# Patient Record
Sex: FEMALE | Race: WHITE | NOT HISPANIC OR LATINO | Employment: FULL TIME | URBAN - METROPOLITAN AREA
[De-identification: names, ages, dates, MRNs, and addresses within clinical notes are randomized per-mention and may not be internally consistent; named-entity substitution may affect disease eponyms.]

---

## 2017-09-25 ENCOUNTER — OFFICE VISIT (OUTPATIENT)
Dept: FAMILY MEDICINE | Facility: CLINIC | Age: 52
End: 2017-09-25
Payer: COMMERCIAL

## 2017-09-25 VITALS
OXYGEN SATURATION: 100 % | BODY MASS INDEX: 25.07 KG/M2 | HEIGHT: 66 IN | HEART RATE: 55 BPM | DIASTOLIC BLOOD PRESSURE: 76 MMHG | RESPIRATION RATE: 18 BRPM | WEIGHT: 156 LBS | TEMPERATURE: 98.2 F | SYSTOLIC BLOOD PRESSURE: 120 MMHG

## 2017-09-25 DIAGNOSIS — Z13.1 SCREENING FOR DIABETES MELLITUS: ICD-10-CM

## 2017-09-25 DIAGNOSIS — Z23 NEED FOR PROPHYLACTIC VACCINATION AND INOCULATION AGAINST INFLUENZA: ICD-10-CM

## 2017-09-25 DIAGNOSIS — Z00.00 ROUTINE GENERAL MEDICAL EXAMINATION AT A HEALTH CARE FACILITY: Primary | ICD-10-CM

## 2017-09-25 DIAGNOSIS — R87.810 CERVICAL HIGH RISK HPV (HUMAN PAPILLOMAVIRUS) TEST POSITIVE: ICD-10-CM

## 2017-09-25 DIAGNOSIS — Z13.220 LIPID SCREENING: ICD-10-CM

## 2017-09-25 LAB
CHOLEST SERPL-MCNC: 171 MG/DL
GLUCOSE SERPL-MCNC: 90 MG/DL (ref 70–99)
HDLC SERPL-MCNC: 67 MG/DL
LDLC SERPL CALC-MCNC: 86 MG/DL
NONHDLC SERPL-MCNC: 104 MG/DL
TRIGL SERPL-MCNC: 89 MG/DL

## 2017-09-25 PROCEDURE — 90471 IMMUNIZATION ADMIN: CPT | Performed by: FAMILY MEDICINE

## 2017-09-25 PROCEDURE — 80061 LIPID PANEL: CPT | Performed by: FAMILY MEDICINE

## 2017-09-25 PROCEDURE — 99396 PREV VISIT EST AGE 40-64: CPT | Mod: 25 | Performed by: FAMILY MEDICINE

## 2017-09-25 PROCEDURE — 87624 HPV HI-RISK TYP POOLED RSLT: CPT | Performed by: FAMILY MEDICINE

## 2017-09-25 PROCEDURE — 90686 IIV4 VACC NO PRSV 0.5 ML IM: CPT | Performed by: FAMILY MEDICINE

## 2017-09-25 PROCEDURE — 88175 CYTOPATH C/V AUTO FLUID REDO: CPT | Performed by: FAMILY MEDICINE

## 2017-09-25 PROCEDURE — 36415 COLL VENOUS BLD VENIPUNCTURE: CPT | Performed by: FAMILY MEDICINE

## 2017-09-25 PROCEDURE — 82947 ASSAY GLUCOSE BLOOD QUANT: CPT | Performed by: FAMILY MEDICINE

## 2017-09-25 NOTE — PROGRESS NOTES
Answers for HPI/ROS submitted by the patient on 9/24/2017   Annual Exam:  Getting at least 3 servings of Calcium per day:: Yes  Bi-annual eye exam:: Yes  Dental care twice a year:: Yes  Sleep apnea or symptoms of sleep apnea:: None  Diet:: Regular (no restrictions)  Frequency of exercise:: 4-5 days/week  Taking medications regularly:: Yes  Medication side effects:: None  Additional concerns today:: No  PHQ-2 Score: 0  Duration of exercise:: 45-60 minutes    Injectable Influenza Immunization Documentation    1.  Is the person to be vaccinated sick today?   No    2. Does the person to be vaccinated have an allergy to a component   of the vaccine?   No    3. Has the person to be vaccinated ever had a serious reaction   to influenza vaccine in the past?   No    4. Has the person to be vaccinated ever had Guillain-Barré syndrome?   No    Form completed by Jennifer Avalos MA

## 2017-09-25 NOTE — NURSING NOTE
"Chief Complaint   Patient presents with     Physical       Initial /76  Pulse 55  Temp 98.2  F (36.8  C) (Oral)  Resp 18  Ht 5' 6.3\" (1.684 m)  Wt 156 lb (70.8 kg)  LMP 05/26/2011  SpO2 100%  BMI 24.95 kg/m2 Estimated body mass index is 24.95 kg/(m^2) as calculated from the following:    Height as of this encounter: 5' 6.3\" (1.684 m).    Weight as of this encounter: 156 lb (70.8 kg).  Medication Reconciliation: complete   Jennifer Avalos MA      "

## 2017-09-25 NOTE — PROGRESS NOTES
SUBJECTIVE:   CC: Elaina Farris is an 52 year old woman who presents for preventive health visit.     Physical   Annual:     Getting at least 3 servings of Calcium per day::  Yes    Bi-annual eye exam::  Yes    Dental care twice a year::  Yes    Sleep apnea or symptoms of sleep apnea::  None    Diet::  Regular (no restrictions)    Frequency of exercise::  4-5 days/week    Duration of exercise::  45-60 minutes    Taking medications regularly::  Yes    Medication side effects::  None    Additional concerns today::  No        Today's PHQ-2 Score:   PHQ-2 ( 1999 Pfizer) 9/24/2017   Q1: Little interest or pleasure in doing things 0   Q2: Feeling down, depressed or hopeless 0   PHQ-2 Score 0   Q1: Little interest or pleasure in doing things Not at all   Q2: Feeling down, depressed or hopeless Not at all   PHQ-2 Score 0       Abuse: Current or Past(Physical, Sexual or Emotional)- No  Do you feel safe in your environment - Yes    Social History   Substance Use Topics     Smoking status: Former Smoker     Packs/day: 1.00     Years: 25.00     Types: Cigarettes     Start date: 1/1/1978     Quit date: 1/1/2003     Smokeless tobacco: Never Used     Alcohol use 36.0 oz/week      Comment: 2 drinks per month     The patient does not drink >3 drinks per day nor >7 drinks per week.    Reviewed orders with patient.  Reviewed health maintenance and updated orders accordingly - Yes  BP Readings from Last 3 Encounters:   09/25/17 120/76   09/21/16 122/79   06/24/15 100/66    Wt Readings from Last 3 Encounters:   09/25/17 156 lb (70.8 kg)   09/21/16 162 lb (73.5 kg)   06/24/15 169 lb 12 oz (77 kg)                          Pertinent mammograms are reviewed under the imaging tab.  History of abnormal Pap smear:   YES - updated in Problem List and Health Maintenance accordingly (pos HR HPV Sept 2016).  Last 3 Pap Results:   PAP (no units)   Date Value   09/21/2016 NIL   06/17/2013 OTHER-NIL, See Result   01/13/2011 NIL       Reviewed  and updated as needed this visit by clinical staffTobacco  Allergies  Meds  Med Hx  Surg Hx  Fam Hx  Soc Hx        Reviewed and updated as needed this visit by Provider  Tobacco  Meds  Med Hx  Surg Hx  Fam Hx  Soc Hx       Past Medical History:   Diagnosis Date     Anxiety disorder      Bulimia nervosa      Cervical high risk HPV (human papillomavirus) test positive 16    (not 16 or 18)     Genital prolapse NEC      Irritable bowel syndrome      Major depression      Menopause 2011      Past Surgical History:   Procedure Laterality Date     C INCISION EXTEN FOOT/TOE TENDON      toe shortened     COLONOSCOPY  10/9/13    rpt 10 yrs     LASIK      bilateral     Obstetric History       T2      L2     SAB0   TAB0   Ectopic0   Multiple0   Live Births2       # Outcome Date GA Lbr Jonathan/2nd Weight Sex Delivery Anes PTL Lv   2 Term 95 40w0d       MOMO   1 Term 93 40w0d       MOMO      Obstetric Comments    x 2       ROS:  CONST: NEGATIVE for fevers/chills/sweats, unexplained weight loss/gain, and fatigue  EYES: NEGATIVE for change in vision  ENT: NEGATIVE for difficulty hearing/tinnitus, and problems with teeth/gums  BREAST: NEGATIVE for breast lump/discharge  CV: NEGATIVE for chest pain/discomfort, leg pain with exercise, and palpitations  RESP: NEGATIVE for cough/wheeze, and difficulty breathing  GI: NEGATIVE for abdominal pain, blood in bowel movement, and nausea/vomiting/diarrhea  : NEGATIVE for nighttime urination, leaking urine, unusual vaginal bleeding, penile/vaginal discharge, and concerns about sexual function  MS: NEGATIVE for muscle/joint pain  SKIN: NEGATIVE for rash or mole change  NEURO: NEGATIVE for headache, dizziness/lightheadedness, numbness, memory loss, and loss of coordination  PSYCH: NEGATIVE for anxiety/stress, problems with sleep, and depression  HEME: NEGATIVE for unexplained lumps, and easy bruising/bleeding  ENDO: NEGATIVE for  "excessive thirst or urination  ALL: NEGATIVE for hay fever/allergies      OBJECTIVE:   /76  Pulse 55  Temp 98.2  F (36.8  C) (Oral)  Resp 18  Ht 5' 6.3\" (1.684 m)  Wt 156 lb (70.8 kg)  LMP 05/26/2011  SpO2 100%  BMI 24.95 kg/m2  EXAM:  GENERAL APPEARANCE: healthy, alert and no distress  EYES: Eyes grossly normal to inspection, PERRL and conjunctivae and sclerae normal  HENT: ear canals and TM's normal, nose and mouth without ulcers or lesions, oropharynx clear and oral mucous membranes moist  NECK: no adenopathy, no asymmetry, masses, or scars and thyroid normal to palpation  RESP: lungs clear to auscultation - no rales, rhonchi or wheezes  BREAST: normal without masses, tenderness or nipple discharge and no palpable axillary masses or adenopathy  CV: regular rate and rhythm, normal S1 S2, no S3 or S4, no murmur, click or rub, no peripheral edema and peripheral pulses strong  ABDOMEN: soft, nontender, no hepatosplenomegaly, no masses and bowel sounds normal   (female): normal female external genitalia, normal urethral meatus, vaginal mucosal atrophy noted, normal cervix, adnexae, and uterus without masses or abnormal discharge  MS: no musculoskeletal defects are noted and gait is age appropriate without ataxia  SKIN: no suspicious lesions or rashes  NEURO: Normal strength and tone, sensory exam grossly normal, mentation intact and speech normal  PSYCH: mentation appears normal and affect normal/bright    ASSESSMENT/PLAN:     1. Routine general medical examination at a health care facility  Mammogram next month    2. Cervical high risk HPV (human papillomavirus) test positive  - Pap imaged thin layer diagnostic with HPV (select HPV order below)  - HPV High Risk Types DNA Cervical    3. Need for prophylactic vaccination and inoculation against influenza  - FLU VAC, SPLIT VIRUS IM > 3 YO (QUADRIVALENT) [11120]  - Vaccine Administration, Initial [99055]    4. Lipid screening  - Lipid panel reflex to " direct LDL    5. Screening for diabetes mellitus  - Glucose     COUNSELING:  Reviewed preventive health counseling, as reflected in patient instructions        Counseling Resources:  ATP IV Guidelines  Pooled Cohorts Equation Calculator  Breast Cancer Risk Calculator  FRAX Risk Assessment  ICSI Preventive Guidelines  Dietary Guidelines for Americans, 2010  [employer]'s MyPlate  ASA Prophylaxis  Lung CA Screening    Bessy Reddy MD  [facility]

## 2017-09-25 NOTE — MR AVS SNAPSHOT
After Visit Summary   9/25/2017    Elaina Farris    MRN: [id number]           Patient Information     Date Of Birth          1965        Visit Information        Provider Department      9/25/2017 9:00 AM Bessy Reddy MD [facility]        Today's Diagnoses     Routine general medical examination at a health care facility    -  1    Cervical high risk HPV (human papillomavirus) test positive        Need for prophylactic vaccination and inoculation against influenza        Lipid screening        Screening for diabetes mellitus          Care Instructions      Preventive Health Recommendations  Female Ages 50 - 64    Yearly exam: See your health care provider every year in order to  o Review health changes.   o Discuss preventive care.    o Review your medicines if your doctor has prescribed any.      Get a Pap test every three years (unless you have an abnormal result and your provider advises testing more often).    If you get Pap tests with HPV test, you only need to test every 5 years, unless you have an abnormal result.     You do not need a Pap test if your uterus was removed (hysterectomy) and you have not had cancer.    You should be tested each year for STDs (sexually transmitted diseases) if you're at risk.     Have a mammogram every 1 to 2 years.    Have a colonoscopy at age 50, or have a yearly FIT test (stool test). These exams screen for colon cancer.      Have a cholesterol test every 5 years, or more often if advised.    Have a diabetes test (fasting glucose) every three years. If you are at risk for diabetes, you should have this test more often.     If you are at risk for osteoporosis (brittle bone disease), think about having a bone density scan (DEXA).    Shots: Get a flu shot each year. Get a tetanus shot every 10 years.    Nutrition:     Eat at least 5 servings of fruits and vegetables each day.    Eat whole-grain bread, whole-wheat pasta and brown rice  "instead of white grains and rice.    Talk to your provider about Calcium and Vitamin D.     Lifestyle    Exercise at least 150 minutes a week (30 minutes a day, 5 days a week). This will help you control your weight and prevent disease.    Limit alcohol to one drink per day.    No smoking.     Wear sunscreen to prevent skin cancer.     See your dentist every six months for an exam and cleaning.    See your eye doctor every 1 to 2 years.            Follow-ups after your visit        Who to contact     If you have questions or need follow up information about today's clinic visit or your schedule please contact [facility] directly at [phone].  Normal or non-critical lab and imaging results will be communicated to you by MyChart, letter or phone within 4 business days after the clinic has received the results. If you do not hear from us within 7 days, please contact the clinic through [employer] or phone. If you have a critical or abnormal lab result, we will notify you by phone as soon as possible.  Submit refill requests through [employer] or call your pharmacy and they will forward the refill request to us. Please allow 3 business days for your refill to be completed.          Additional Information About Your Visit        [employer] Information     [employer] gives you secure access to your electronic health record. If you see a primary care provider, you can also send messages to your care team and make appointments. If you have questions, please call your primary care clinic.  If you do not have a primary care provider, please call [phone] and they will assist you.        Care EveryWhere ID     This is your Care EveryWhere ID. This could be used by other organizations to access your [place] medical records  [id number]        Your Vitals Were     Pulse Temperature Respirations Height Last Period Pulse Oximetry    55 98.2  F (36.8  C) (Oral) 18 5' 6.3\" (1.684 m) 05/26/2011 100%    BMI (Body Mass " Index)                   24.95 kg/m2            Blood Pressure from Last 3 Encounters:   09/25/17 120/76   09/21/16 122/79   06/24/15 100/66    Weight from Last 3 Encounters:   09/25/17 156 lb (70.8 kg)   09/21/16 162 lb (73.5 kg)   06/24/15 169 lb 12 oz (77 kg)              We Performed the Following     Glucose     HPV High Risk Types DNA Cervical     Lipid panel reflex to direct LDL     Pap imaged thin layer diagnostic with HPV (select HPV order below)        Primary Care Provider Office Phone # Fax #    Bessy Reddy [phone]       [address]  [facility] [address]        Equal Access to Services     ARSENIO MCGREGOR : Baldemar Haque, elvira mendes, augustin roblesmakacy ye, mamadou west . So [facility] [phone].    ATENCIÓN: Si habla español, tiene a calabrese disposición servicios gratuitos de asistencia lingüística. Llame al [phone].    We comply with applicable federal civil rights laws and [place] laws. We do not discriminate on the basis of race, color, national origin, age, disability sex, sexual orientation or gender identity.            Thank you!     Thank you for choosing [facility]  for your care. Our goal is always to provide you with excellent care. Hearing back from our patients is one way we can continue to improve our services. Please take a few minutes to complete the written survey that you may receive in the mail after your visit with us. Thank you!             Your Updated Medication List - Protect others around you: Learn how to safely use, store and throw away your medicines at www.disposemymeds.org.          This list is accurate as of: 9/25/17  9:44 AM.  Always use your most recent med list.                   Brand Name Dispense Instructions for use Diagnosis    traZODone 50 MG tablet    DESYREL    90 tablet    Take 1/2-1 tab at bedtime as needed for sleep    Insomnia, unspecified type

## 2017-09-27 LAB
COPATH REPORT: NORMAL
PAP: NORMAL

## 2017-09-29 LAB
FINAL DIAGNOSIS: ABNORMAL
HPV HR 12 DNA CVX QL NAA+PROBE: POSITIVE
HPV16 DNA SPEC QL NAA+PROBE: NEGATIVE
HPV18 DNA SPEC QL NAA+PROBE: NEGATIVE
SPECIMEN DESCRIPTION: ABNORMAL

## 2017-10-25 ENCOUNTER — OFFICE VISIT (OUTPATIENT)
Dept: OBGYN | Facility: CLINIC | Age: 52
End: 2017-10-25
Payer: COMMERCIAL

## 2017-10-25 DIAGNOSIS — R87.810 CERVICAL HIGH RISK HPV (HUMAN PAPILLOMAVIRUS) TEST POSITIVE: Primary | ICD-10-CM

## 2017-10-25 LAB — BETA HCG QUAL IFA URINE: ABNORMAL

## 2017-10-25 PROCEDURE — 57455 BIOPSY OF CERVIX W/SCOPE: CPT | Performed by: OBSTETRICS & GYNECOLOGY

## 2017-10-25 PROCEDURE — 88305 TISSUE EXAM BY PATHOLOGIST: CPT | Performed by: OBSTETRICS & GYNECOLOGY

## 2017-10-25 PROCEDURE — 99201 ZZC OFFICE/OUTPT VISIT, NEW, LEVEL I: CPT | Mod: 25 | Performed by: OBSTETRICS & GYNECOLOGY

## 2017-10-25 NOTE — PROGRESS NOTES
INDICATION: Elaina Farris is a 52 year old female who presents for colposcopy.  Pap smear was reported as NIL, other HPV + (same as last year).   Informed consent obtained for procedure.  We discussed cervical dysplasia and HPV.              COLPOSCOPIC EXAM:  Satisfactory      Using standard technique and acetic acid application, the cervix and vagina were examined using the colposcope.  The transformation zone appeared abnormal, with minimal white epithelium most prominent posterior to the right, and representative biopsy of 7:00 was sent.  Two small polypoid masses were noted protruding from the cervical os, and these were biopsy-removed.    ECC was not performed.   Monsels applied for hemostasis.    Colposcopic Impression: NIL pap.  Other HPV +.  Possible dysplasia.     PLAN: Post Colposcopy Instructions were given.  Call in 1 week for biopsy results.  Will advise followup depending on results.      In addition to the procedure, I spent  10 minutes with the patient, with more than 50% spent in counseling/discussing the diagnosis and treatment options.

## 2017-10-25 NOTE — MR AVS SNAPSHOT
After Visit Summary   10/25/2017    Elaina Farris    MRN: [id number]           Patient Information     Date Of Birth          1965        Visit Information        Provider Department      10/25/2017 11:15 AM Annmarie Mejia MD [facility]        Today's Diagnoses     [place] for NIL pap, other HPV +    -  1      Care Instructions    Colposcopy Post-Procedure Patient Instructions      You may experience any of the following for a couple of days following colposcopy:    Mild cramping    Vaginal bleeding     Vaginal discharge that looks black and clumpy    Please call your healthcare provider if you have any of the following symptoms:    Fever--Temperature greater than 100 degrees    Cramping after 48 hours    Bleeding heavier than a normal period in the first 24-48 hours    If you are bleeding and soaking more than one pad an hour    Or any other worrisome problems.    We recommend that:    You use pads, not tampons for the bleeding.    You may resume sexual activity in 2-3 days, or after bleeding stops.    You may use Tylenol or ibuprofen (Motrin or Advil) for any discomfort.      [facility] - OB/[facility]N : [phone]            Follow-ups after your visit        Your next 10 appointments already scheduled     Oct 27, 2017 12:45 PM CDT   Screening Mammogram with EAMA1   [facility] ([facility])    [address] ,[address]  [facility] [address] [phone]           Do NOT use body powder, lotions, perfume or deodorant the day of the exam.  If your last mammogram was not done at [place], please bring your mammogram films. We will need the name of your provider to send a copy of your report.  A mammogram may be covered on an annual or biannual basis, please check with your insurance company.              Future tests that were ordered for you today     Open Future Orders        Priority Expected Expires Ordered    MA Screening Digital  Bilateral Routine  10/25/2018 10/25/2017            Who to contact     If you have questions or need follow up information about today's clinic visit or your schedule please contact [facility] directly at [phone].  Normal or non-critical lab and imaging results will be communicated to you by MyChart, letter or phone within 4 business days after the clinic has received the results. If you do not hear from us within 7 days, please contact the clinic through [employer] or phone. If you have a critical or abnormal lab result, we will notify you by phone as soon as possible.  Submit refill requests through [employer] or call your pharmacy and they will forward the refill request to us. Please allow 3 business days for your refill to be completed.          Additional Information About Your Visit        [email or web address] Information     [employer] gives you secure access to your electronic health record. If you see a primary care provider, you can also send messages to your care team and make appointments. If you have questions, please call your primary care clinic.  If you do not have a primary care provider, please call [phone] and they will assist you.        Care EveryWhere ID     This is your Care EveryWhere ID. This could be used by other organizations to access your [place] medical records  [id number]        Your Vitals Were     Last Period                   05/26/2011            Blood Pressure from Last 3 Encounters:   09/25/17 120/76   09/21/16 122/79   06/24/15 100/66    Weight from Last 3 Encounters:   09/25/17 156 lb (70.8 kg)   09/21/16 162 lb (73.5 kg)   06/24/15 169 lb 12 oz (77 kg)              We Performed the Following     Beta HCG qual IFA urine     COLP CERVIX/UPPER VAGINA W BX CERVIX     Surgical pathology exam        Primary Care Provider Office Phone # Fax #    Bessy Reddy [phone] [address] [place] [address]        Equal Access to Services     ARSENIO  GAAR : Hadii aad sonny horacio Haque, warejida luqadaha, qaybta kameera gwendolyndenver, waxmary giuseppe hayzulma eduardoalexcynthia quinones. So [facility] [phone].    ATENCIÓN: Si habla español, tiene a calabrese disposición servicios gratuitos de asistencia lingüística. Llame al [phone].    We comply with applicable federal civil rights laws and [place] laws. We do not discriminate on the basis of race, color, national origin, age, disability, sex, sexual orientation, or gender identity.            Thank you!     Thank you for choosing [facility]  for your care. Our goal is always to provide you with excellent care. Hearing back from our patients is one way we can continue to improve our services. Please take a few minutes to complete the written survey that you may receive in the mail after your visit with us. Thank you!             Your Updated Medication List - Protect others around you: Learn how to safely use, store and throw away your medicines at www.disposemymeds.org.          This list is accurate as of: 10/25/17  1:03 PM.  Always use your most recent med list.                   Brand Name Dispense Instructions for use Diagnosis    traZODone 50 MG tablet    DESYREL    90 tablet    Take 1/2-1 tab at bedtime as needed for sleep    Insomnia, unspecified type

## 2017-10-25 NOTE — PATIENT INSTRUCTIONS

## 2017-10-25 NOTE — NURSING NOTE
"Chief Complaint   Patient presents with     Colposcopy       Initial LMP 2011 Estimated body mass index is 24.95 kg/(m^2) as calculated from the following:    Height as of 17: 5' 6.3\" (1.684 m).    Weight as of 17: 156 lb (70.8 kg).  BP completed using cuff size: regular        The following HM Due: NONE      The following patient reported/Care Every where data was sent to:  P ABSTRACT QUALITY INITIATIVES [28531]  none     n/a             "

## 2017-10-27 DIAGNOSIS — Z12.31 VISIT FOR SCREENING MAMMOGRAM: ICD-10-CM

## 2017-10-27 LAB — COPATH REPORT: NORMAL

## 2017-10-27 PROCEDURE — G0202 SCR MAMMO BI INCL CAD: HCPCS | Mod: TC

## 2018-09-13 ENCOUNTER — OFFICE VISIT (OUTPATIENT)
Dept: FAMILY MEDICINE | Facility: CLINIC | Age: 53
End: 2018-09-13
Payer: COMMERCIAL

## 2018-09-13 ENCOUNTER — TRANSFERRED RECORDS (OUTPATIENT)
Dept: HEALTH INFORMATION MANAGEMENT | Facility: CLINIC | Age: 53
End: 2018-09-13

## 2018-09-13 VITALS
OXYGEN SATURATION: 99 % | TEMPERATURE: 98.9 F | SYSTOLIC BLOOD PRESSURE: 107 MMHG | WEIGHT: 154 LBS | DIASTOLIC BLOOD PRESSURE: 75 MMHG | BODY MASS INDEX: 24.75 KG/M2 | HEART RATE: 53 BPM | HEIGHT: 66 IN | RESPIRATION RATE: 18 BRPM

## 2018-09-13 DIAGNOSIS — Z13.220 LIPID SCREENING: ICD-10-CM

## 2018-09-13 DIAGNOSIS — Z13.1 SCREENING FOR DIABETES MELLITUS: ICD-10-CM

## 2018-09-13 DIAGNOSIS — G47.00 INSOMNIA, UNSPECIFIED TYPE: ICD-10-CM

## 2018-09-13 DIAGNOSIS — Z00.00 ROUTINE GENERAL MEDICAL EXAMINATION AT A HEALTH CARE FACILITY: Primary | ICD-10-CM

## 2018-09-13 DIAGNOSIS — R87.810 CERVICAL HIGH RISK HPV (HUMAN PAPILLOMAVIRUS) TEST POSITIVE: ICD-10-CM

## 2018-09-13 PROCEDURE — 36415 COLL VENOUS BLD VENIPUNCTURE: CPT | Performed by: FAMILY MEDICINE

## 2018-09-13 PROCEDURE — 99396 PREV VISIT EST AGE 40-64: CPT | Performed by: FAMILY MEDICINE

## 2018-09-13 PROCEDURE — 87624 HPV HI-RISK TYP POOLED RSLT: CPT | Performed by: FAMILY MEDICINE

## 2018-09-13 PROCEDURE — 82947 ASSAY GLUCOSE BLOOD QUANT: CPT | Performed by: FAMILY MEDICINE

## 2018-09-13 PROCEDURE — 88175 CYTOPATH C/V AUTO FLUID REDO: CPT | Performed by: FAMILY MEDICINE

## 2018-09-13 PROCEDURE — 80061 LIPID PANEL: CPT | Performed by: FAMILY MEDICINE

## 2018-09-13 RX ORDER — TRAZODONE HYDROCHLORIDE 50 MG/1
TABLET, FILM COATED ORAL
Qty: 90 TABLET | Refills: 1 | Status: SHIPPED | OUTPATIENT
Start: 2018-09-13 | End: 2019-10-21

## 2018-09-13 RX ORDER — TRAZODONE HYDROCHLORIDE 50 MG/1
TABLET, FILM COATED ORAL
Qty: 90 TABLET | Refills: 1 | Status: SHIPPED | OUTPATIENT
Start: 2018-09-13 | End: 2018-09-13

## 2018-09-13 NOTE — MR AVS SNAPSHOT
After Visit Summary   9/13/2018    Elaina Farris    MRN: [id number]           Patient Information     Date Of Birth          1965        Visit Information        Provider Department      9/13/2018 12:00 PM Bessy Reddy MD [facility] [place]        Today's Diagnoses     Routine general medical examination at a health care facility    -  1    Cervical high risk HPV (human papillomavirus) test positive        Lipid screening        Screening for diabetes mellitus        Insomnia, unspecified type          Care Instructions      Preventive Health Recommendations  Female Ages 50 - 64    Yearly exam: See your health care provider every year in order to  o Review health changes.   o Discuss preventive care.    o Review your medicines if your doctor has prescribed any.      Get a Pap test every three years (unless you have an abnormal result and your provider advises testing more often).    If you get Pap tests with HPV test, you only need to test every 5 years, unless you have an abnormal result.     You do not need a Pap test if your uterus was removed (hysterectomy) and you have not had cancer.    You should be tested each year for STDs (sexually transmitted diseases) if you're at risk.     Have a mammogram every 1 to 2 years.    Have a colonoscopy at age 50, or have a yearly FIT test (stool test). These exams screen for colon cancer.      Have a cholesterol test every 5 years, or more often if advised.    Have a diabetes test (fasting glucose) every three years. If you are at risk for diabetes, you should have this test more often.     If you are at risk for osteoporosis (brittle bone disease), think about having a bone density scan (DEXA).    Shots: Get a flu shot each year. Get a tetanus shot every 10 years.    Nutrition:     Eat at least 5 servings of fruits and vegetables each day.    Eat whole-grain bread, whole-wheat pasta and brown rice instead of white grains and rice.    Get  "adequate Calcium and Vitamin D.     Lifestyle    Exercise at least 150 minutes a week (30 minutes a day, 5 days a week). This will help you control your weight and prevent disease.    Limit alcohol to one drink per day.    No smoking.     Wear sunscreen to prevent skin cancer.     See your dentist every six months for an exam and cleaning.    See your eye doctor every 1 to 2 years.            Follow-ups after your visit        Your next 10 appointments already scheduled     Oct 29, 2018  7:30 AM CDT   MA SCREENING BILATERAL W/ YVES with EAMA1   [facility]    [address] ,[address]  [facility] [address] [phone]           Three-dimensional (3D) mammograms are available at [place] locations in [facility], [facility], [facility], and [place]. [facility] locations include [place] [facility] in [place]. Benefits of 3D mammograms include: - Improved rate of cancer detection - Decreases your chance of having to go back for more tests, which means fewer: - \"False-positive\" results (This means that there is an abnormal area but it isn't cancer.) - Invasive testing procedures, such as a biopsy or surgery - Can provide clearer images of the breast if you have dense breast tissue. 3D mammography is an optional exam that anyone can have with a 2D mammogram. It doesn't replace or take the place of a 2D mammogram. 2D mammograms remain an effective screening test for all women.  Not all insurance companies cover the cost of a 3D mammogram. Check with your insurance.              Who to contact     If you have questions or need follow up information about today's clinic visit or your schedule please contact [facility] directly at [phone].  Normal or non-critical lab and imaging results will be communicated to you by MyChart, letter or phone within 4 business days after the clinic has received " "the results. If you do not hear from us within 7 days, please contact the clinic through [employer] or phone. If you have a critical or abnormal lab result, we will notify you by phone as soon as possible.  Submit refill requests through [employer] or call your pharmacy and they will forward the refill request to us. Please allow 3 business days for your refill to be completed.          Additional Information About Your Visit        [employer] Information     [employer] gives you secure access to your electronic health record. If you see a primary care provider, you can also send messages to your care team and make appointments. If you have questions, please call your primary care clinic.  If you do not have a primary care provider, please call [phone] and they will assist you.        Care EveryWhere ID     This is your Care EveryWhere ID. This could be used by other organizations to access your [place] medical records  [id number]        Your Vitals Were     Pulse Temperature Respirations Height Last Period Pulse Oximetry    53 98.9  F (37.2  C) (Oral) 18 5' 6\" (1.676 m) 05/26/2011 99%    BMI (Body Mass Index)                   24.86 kg/m2            Blood Pressure from Last 3 Encounters:   09/13/18 107/75   09/25/17 120/76   09/21/16 122/79    Weight from Last 3 Encounters:   09/13/18 154 lb (69.9 kg)   09/25/17 156 lb (70.8 kg)   09/21/16 162 lb (73.5 kg)              We Performed the Following     Glucose     HPV High Risk Types DNA Cervical     Lipid panel reflex to direct LDL Fasting     Pap imaged thin layer diagnostic with HPV (select HPV order below)          Today's Medication Changes          These changes are accurate as of 9/13/18 12:41 PM.  If you have any questions, ask your nurse or doctor.               Start taking these medicines.        Dose/Directions    traZODone 50 MG tablet   Commonly known as:  DESYREL   Used for:  Insomnia, unspecified type   Started by:  Bessy Reddy MD        Take 1/2-1 " tab at bedtime as needed for sleep   Quantity:  90 tablet   Refills:  1            Where to get your medicines      These medications were sent to [facility] - [address], [facility] [address]     Phone:  [phone]     traZODone 50 MG tablet                Primary Care Provider Office Phone # Fax #    Bessy Reddy [phone]       [address]  [facility] [address]        Equal Access to Services     [facility]: Hadii aad ku hadasho Soomaali, waaxda luqadaha, qaybta kaalmada adeegyada, waxay idiin hayaan adeeg kharacynthia west . So [facility] [phone].    ATENCIÓN: Si habla español, tiene a calabrese disposición servicios gratuitos de asistencia lingüística. [facility] [phone].    We comply with applicable federal civil rights laws and [place] laws. We do not discriminate on the basis of race, color, national origin, age, disability, sex, sexual orientation, or gender identity.            Thank you!     Thank you for choosing [facility]  for your care. Our goal is always to provide you with excellent care. Hearing back from our patients is one way we can continue to improve our services. Please take a few minutes to complete the written survey that you may receive in the mail after your visit with us. Thank you!             Your Updated Medication List - Protect others around you: Learn how to safely use, store and throw away your medicines at www.disposemymeds.org.          This list is accurate as of 9/13/18 12:41 PM.  Always use your most recent med list.                   Brand Name Dispense Instructions for use Diagnosis    traZODone 50 MG tablet    DESYREL    90 tablet    Take 1/2-1 tab at bedtime as needed for sleep    Insomnia, unspecified type

## 2018-09-13 NOTE — PROGRESS NOTES
SUBJECTIVE:   CC: Elaina Farris is an 53 year old woman who presents for preventive health visit.     Physical   Annual:     Getting at least 3 servings of Calcium per day:  Yes    Bi-annual eye exam:  NO    Dental care twice a year:  Yes    Sleep apnea or symptoms of sleep apnea:  None    Diet:  Regular (no restrictions)    Frequency of exercise:  4-5 days/week    Duration of exercise:  45-60 minutes    Taking medications regularly:  Not Applicable    Additional concerns today:  No      Today's PHQ-2 Score:   PHQ-2 ( 1999 [employer]) 9/12/2018   Q1: Little interest or pleasure in doing things 0   Q2: Feeling down, depressed or hopeless 0   PHQ-2 Score 0   Q1: Little interest or pleasure in doing things Not at all   Q2: Feeling down, depressed or hopeless Not at all   PHQ-2 Score 0       Abuse: Current or Past(Physical, Sexual or Emotional)- No  Do you feel safe in your environment - Yes    Social History   Substance Use Topics     Smoking status: Former Smoker     Packs/day: 1.00     Years: 25.00     Types: Cigarettes     Start date: 1/1/1978     Quit date: 1/1/2003     Smokeless tobacco: Never Used     Alcohol use 36.0 oz/week      Comment: Less than 3 a week     Alcohol Use 9/12/2018   If you drink alcohol do you typically have greater than 3 drinks per day OR greater than 7 drinks per week? No       Reviewed orders with patient.  Reviewed health maintenance and updated orders accordingly - Yes  BP Readings from Last 3 Encounters:   09/13/18 107/75   09/25/17 120/76   09/21/16 122/79    Wt Readings from Last 3 Encounters:   09/13/18 154 lb (69.9 kg)   09/25/17 156 lb (70.8 kg)   09/21/16 162 lb (73.5 kg)                        Pertinent mammograms are reviewed under the imaging tab.  History of abnormal Pap smear: YES - updated in Problem List and Health Maintenance accordingly  PAP / HPV Latest Ref Rng & Units 9/25/2017 9/21/2016 6/17/2013   PAP - NIL NIL OTHER-NIL, See Result   HPV 16 DNA NEG:Negative  Negative Negative -   HPV 18 DNA NEG:Negative Negative Negative -   OTHER HR HPV NEG:Negative Positive(A) Positive(A) -     Reviewed and updated as needed this visit by clinical staff  Tobacco  Allergies  Meds  Med Hx  Surg Hx  Fam Hx  Soc Hx        Reviewed and updated as needed this visit by Provider  Tobacco  Med Hx  Surg Hx  Fam Hx  Soc Hx       Past Medical History:   Diagnosis Date     Anxiety disorder      Bulimia nervosa      Cervical high risk HPV (human papillomavirus) test positive 16, 17    (not 16 or 18)     Irritable bowel syndrome      Major depression      Menopause 2011     Other specified genital prolapse(618.89)       Past Surgical History:   Procedure Laterality Date     C INCISION EXTEN FOOT/TOE TENDON      toe shortened     COLONOSCOPY  10/9/13    rpt 10 yrs     LASIK      bilateral     Obstetric History       T2      L2     SAB0   TAB0   Ectopic0   Multiple0   Live Births2       # Outcome Date GA Lbr Jonathan/2nd Weight Sex Delivery Anes PTL Lv   2 Term 95 40w0d       MOMO   1 Term 93 40w0d       MOMO      Obstetric Comments    x 2       Review of Systems  CONST: NEGATIVE for fevers/chills/sweats, unexplained weight loss/gain, and fatigue  EYES: NEGATIVE for change in vision  ENT: NEGATIVE for difficulty hearing/tinnitus, and problems with teeth/gums  BREAST: NEGATIVE for breast lump/discharge  CV: NEGATIVE for chest pain/discomfort, leg pain with exercise, and palpitations  RESP: NEGATIVE for cough/wheeze, and difficulty breathing  GI: NEGATIVE for abdominal pain, blood in bowel movement, and nausea/vomiting/diarrhea  : NEGATIVE for nighttime urination, leaking urine, unusual vaginal bleeding, vaginal discharge, and concerns about sexual function  MS: NEGATIVE for muscle/joint pain  SKIN: NEGATIVE for rash or mole change  NEURO: NEGATIVE for headache, dizziness/lightheadedness, numbness, memory loss, and loss of  "coordination  PSYCH: NEGATIVE for anxiety/stress, problems with sleep, and depression  HEME: NEGATIVE for unexplained lumps, and easy bruising/bleeding  ENDO: NEGATIVE for excessive thirst or urination  ALL: NEGATIVE for hay fever/allergies      OBJECTIVE:   /75 (BP Location: Right arm, Patient Position: Sitting, Cuff Size: Adult Regular)  Pulse 53  Temp 98.9  F (37.2  C) (Oral)  Resp 18  Ht 5' 6\" (1.676 m)  Wt 154 lb (69.9 kg)  LMP 05/26/2011  SpO2 99%  BMI 24.86 kg/m2  Physical Exam  GENERAL APPEARANCE: healthy, alert and no distress  EYES: Eyes grossly normal to inspection, PERRL and conjunctivae and sclerae normal  HENT: ear canals and TM's normal, nose and mouth without ulcers or lesions, oropharynx clear and oral mucous membranes moist  NECK: no adenopathy, no asymmetry, masses, or scars and thyroid normal to palpation  RESP: lungs clear to auscultation - no rales, rhonchi or wheezes  BREAST: normal without masses, tenderness or nipple discharge and no palpable axillary masses or adenopathy  CV: regular rate and rhythm, normal S1 S2, no S3 or S4, no murmur, click or rub, no peripheral edema and peripheral pulses strong  ABDOMEN: soft, nontender, no hepatosplenomegaly, no masses    (female): normal female external genitalia, normal urethral meatus, vaginal mucosal atrophy noted, normal cervix, and no abnormal discharge  MS: no musculoskeletal defects are noted and gait is age appropriate without ataxia  SKIN: no suspicious lesions or rashes  NEURO: Normal strength and tone, sensory exam grossly normal, mentation intact and speech normal  PSYCH: mentation appears normal and affect normal/bright    ASSESSMENT/PLAN:   1. Routine general medical examination at a health care facility  - Lipid panel reflex to direct LDL Fasting  - Glucose    2. Cervical high risk HPV (human papillomavirus) test positive  - Pap imaged thin layer diagnostic with HPV (select HPV order below)  - HPV High Risk Types DNA " Cervical    3. Lipid screening  - Lipid panel reflex to direct LDL Fasting    4. Screening for diabetes mellitus  - Glucose    5. Insomnia, unspecified type  Occasional use of trazodone  - traZODone (DESYREL) 50 MG tablet; Take 1/2-1 tab at bedtime as needed for sleep  Dispense: 90 tablet; Refill: 1    COUNSELING:  Reviewed preventive health counseling, as reflected in patient instructions      Counseling Resources:  ATP IV Guidelines  Pooled Cohorts Equation Calculator  Breast Cancer Risk Calculator  FRAX Risk Assessment  ICSI Preventive Guidelines  Dietary Guidelines for Americans, 2010  USDA's MyPlate  ASA Prophylaxis  Lung CA Screening    Bessy Reddy MD  [facility]

## 2018-09-13 NOTE — LETTER
[facility] [place]  [address] [place] [address] [phone]    October 2, 2018    Elainamaury Burksna                                                                                                                     [address] [place] [id number]          Dear MsHudson,    We are contacting you in writing because we have been unable to reach you by phone.    This letter is in regards to the pap smear and HPV (Human Papillomavirus) test you had done recently. Your PAP test result is normal, but your HPV (Human Papillomavirus) test was positive for a high risk type of the HPV. HPV is a common virus found in sexually active men and women. Some high risk strains of the HPV virus can be risk factors for later development of cervical cancer.    About 80 percent of women have been exposed to HPV virus throughout their lifetime. There is no medication for the treatment of HPV. Typically your own immune system gets rid of the virus before it does harm. HPV is spread by direct skin-to-skin contact, including sexual intercourse, oral sex, anal sex, or any other contact involving the genital area (example: hand to genital contact). It is not possible to become infected with HPV by touching an object, such as a toilet seat. Most people who are infected with HPV have no signs or symptoms.    Your doctor has recommended that you have specific test called colposcopy. This test allows the provider to closely examine your cervix. If biopsies are taken, the results will be complete within two weeks and will be used to determine the plan for future follow-up.      Please call [facility] to schedule this procedure with Dr. Mejia. Schedule this for a time when you are not due to have a period (if having regular menstrual bleeding). You can take an over the counter pain reliever 1 hour before your colposcopy. Nothing in the vagina for 24 hours before your colposcopy (no sex, douches,  vaginal medications or lubricants). They may also check a urine pregnancy test.     If you have additional questions regarding this result, please call our registered nurse, Eielen at [phone].    Sincerely,  Bessy Reddy MD./  Eileen Berry RN-Pap Tracking

## 2018-09-13 NOTE — PROGRESS NOTES
"  Injectable Influenza Immunization Documentation    1.  Is the person to be vaccinated sick today?  {YES/NO DEFAULT NO:25156::\" No\"}    2. Does the person to be vaccinated have an allergy to a component   of the vaccine?  {YES/NO DEFAULT NO:19902::\" No\"}  Egg Allergy Algorithm Link    3. Has the person to be vaccinated ever had a serious reaction   to influenza vaccine in the past?  {YES/NO DEFAULT NO:97040::\" No\"}    4. Has the person to be vaccinated ever had Guillain-Barré syndrome?  {YES/NO DEFAULT NO:17837::\" No\"}    Form completed by ***           Injectable Influenza Immunization Documentation    1.  Is the person to be vaccinated sick today?  {YES/NO DEFAULT NO:23161::\" No\"}    2. Does the person to be vaccinated have an allergy to a component   of the vaccine?  {YES/NO DEFAULT NO:34536::\" No\"}  Egg Allergy Algorithm Link    3. Has the person to be vaccinated ever had a serious reaction   to influenza vaccine in the past?  {YES/NO DEFAULT NO:99144::\" No\"}    4. Has the person to be vaccinated ever had Guillain-Barré syndrome?  {YES/NO DEFAULT NO:77795::\" No\"}    Form completed by ***         "

## 2018-09-14 LAB
CHOLEST SERPL-MCNC: 184 MG/DL
GLUCOSE SERPL-MCNC: 95 MG/DL (ref 70–99)
HDLC SERPL-MCNC: 75 MG/DL
LDLC SERPL CALC-MCNC: 89 MG/DL
NONHDLC SERPL-MCNC: 109 MG/DL
TRIGL SERPL-MCNC: 98 MG/DL

## 2018-09-14 NOTE — PROGRESS NOTES
Anusha Pruitt lipids and blood sugar results.  Your HDL (good) cholesterol is even higher - probably from all that exercise.  Good for you!    Bessy Reddy MD

## 2018-09-18 LAB
COPATH REPORT: NORMAL
PAP: NORMAL

## 2018-09-19 LAB
FINAL DIAGNOSIS: ABNORMAL
HPV HR 12 DNA CVX QL NAA+PROBE: POSITIVE
HPV16 DNA SPEC QL NAA+PROBE: NEGATIVE
HPV18 DNA SPEC QL NAA+PROBE: NEGATIVE
SPECIMEN DESCRIPTION: ABNORMAL
SPECIMEN SOURCE CVX/VAG CYTO: ABNORMAL

## 2018-10-29 ENCOUNTER — RADIANT APPOINTMENT (OUTPATIENT)
Dept: MAMMOGRAPHY | Facility: CLINIC | Age: 53
End: 2018-10-29
Attending: FAMILY MEDICINE
Payer: COMMERCIAL

## 2018-10-29 DIAGNOSIS — Z12.31 VISIT FOR SCREENING MAMMOGRAM: ICD-10-CM

## 2018-10-29 PROCEDURE — 77063 BREAST TOMOSYNTHESIS BI: CPT | Mod: TC

## 2018-10-29 PROCEDURE — 77067 SCR MAMMO BI INCL CAD: CPT | Mod: TC

## 2018-10-31 ENCOUNTER — OFFICE VISIT (OUTPATIENT)
Dept: OBGYN | Facility: CLINIC | Age: 53
End: 2018-10-31
Payer: COMMERCIAL

## 2018-10-31 VITALS
DIASTOLIC BLOOD PRESSURE: 94 MMHG | BODY MASS INDEX: 25.63 KG/M2 | HEIGHT: 66 IN | WEIGHT: 159.5 LBS | SYSTOLIC BLOOD PRESSURE: 143 MMHG | HEART RATE: 63 BPM

## 2018-10-31 DIAGNOSIS — B97.7 HPV IN FEMALE: Primary | ICD-10-CM

## 2018-10-31 DIAGNOSIS — Z23 NEED FOR SHINGLES VACCINE: ICD-10-CM

## 2018-10-31 PROCEDURE — 90750 HZV VACC RECOMBINANT IM: CPT | Performed by: OBSTETRICS & GYNECOLOGY

## 2018-10-31 PROCEDURE — 90471 IMMUNIZATION ADMIN: CPT | Performed by: OBSTETRICS & GYNECOLOGY

## 2018-10-31 PROCEDURE — 57455 BIOPSY OF CERVIX W/SCOPE: CPT | Performed by: OBSTETRICS & GYNECOLOGY

## 2018-10-31 PROCEDURE — 88305 TISSUE EXAM BY PATHOLOGIST: CPT | Performed by: OBSTETRICS & GYNECOLOGY

## 2018-10-31 NOTE — PATIENT INSTRUCTIONS

## 2018-10-31 NOTE — PROGRESS NOTES
INDICATION: Elaina Farris is a 53 year old female who presents for colposcopy.  Pap smear was reported as NIL, other HPV + (same as last year, had colp, benign biopsy 7:00, benign endocervical polyp removed.          Informed consent obtained for procedure.               COLPOSCOPIC EXAM:  Cervix is fully visualized.  Squamocolumnar junction is fully visualized.      Using standard technique and acetic acid application, the cervix and vagina were examined using the colposcope.  The transformation zone appeared abnormal, with minimal acetowhite epithelium most prominent anterior, and representative biopsy of 12:00 was sent.     ECC was not performed.   Monsels applied for hemostasis.    Colposcopic Impression:   Low grade.        PLAN: Post Colposcopy Instructions were given.  Call in 1 week for biopsy results.  Will advise followup depending on results.

## 2018-10-31 NOTE — MR AVS SNAPSHOT
After Visit Summary   10/31/2018    Elaina Farris    MRN: [id number]           Patient Information     Date Of Birth          1965        Visit Information        Provider Department      10/31/2018 2:00 PM Annmarie Mejia MD [facility]        Today's Diagnoses     [place] for NIL pap, other HPV +    -  1      Care Instructions    Colposcopy Post-Procedure Patient Instructions      You may experience any of the following for a couple of days following colposcopy:    Mild cramping    Vaginal bleeding     Vaginal discharge that looks black and clumpy    Please call your healthcare provider if you have any of the following symptoms:    Fever--Temperature greater than 100 degrees    Cramping after 48 hours    Bleeding heavier than a normal period in the first 24-48 hours    If you are bleeding and soaking more than one pad an hour    Or any other worrisome problems.    We recommend that:    You use pads, not tampons for the bleeding.    You may resume sexual activity in 2-3 days, or after bleeding stops.    You may use Tylenol or ibuprofen (Motrin or Advil) for any discomfort.      [facility] - [facility] : [phone]            Follow-ups after your visit        Who to contact     If you have questions or need follow up information about today's clinic visit or your schedule please contact [facility] directly at [phone].  Normal or non-critical lab and imaging results will be communicated to you by MyChart, letter or phone within 4 business days after the clinic has received the results. If you do not hear from us within 7 days, please contact the clinic through MyChart or phone. If you have a critical or abnormal lab result, we will notify you by phone as soon as possible.  Submit refill requests through [employer] or call your pharmacy and they will forward the refill request to us. Please allow 3 business days for your refill to be completed.           "Additional Information About Your Visit        MyChart Information     [employer] gives you secure access to your electronic health record. If you see a primary care provider, you can also send messages to your care team and make appointments. If you have questions, please call your primary care clinic.  If you do not have a primary care provider, please call [phone] and they will assist you.        Care EveryWhere ID     This is your Care EveryWhere ID. This could be used by other organizations to access your [place] medical records  [id number]        Your Vitals Were     Pulse Height Last Period Breastfeeding? BMI (Body Mass Index)       63 5' 6\" (1.676 m) 05/26/2011 No 25.74 kg/m2        Blood Pressure from Last 3 Encounters:   10/31/18 (!) 143/94   09/13/18 107/75   09/25/17 120/76    Weight from Last 3 Encounters:   10/31/18 159 lb 8 oz (72.3 kg)   09/13/18 154 lb (69.9 kg)   09/25/17 156 lb (70.8 kg)              We Performed the Following     COLP CERVIX/UPPER VAGINA W BX CERVIX     Surgical pathology exam        Primary Care Provider Office Phone # Fax #    Bessy Reddy [phone]       [address]  [place] [id number]        Equal Access to Services     ARSENIO MCGREGOR : Hadii denisse ku hadasho Soomaali, waaxda luqadaha, qaybta kaalmada adeegyada, waxay adrianain haynigeln lurdes caputo lamelchor . So [facility] [phone].    ATENCIÓN: Si habla español, tiene a calabrese disposición servicios gratConcept.ioos de asistencia lingüística. Llame al [phone].    We comply with applicable federal civil rights laws and [place] laws. We do not discriminate on the basis of race, color, national origin, age, disability, sex, sexual orientation, or gender identity.            Thank you!     Thank you for choosing [facility]  for your care. Our goal is always to provide you with excellent care. Hearing back from our patients is one way we can continue to improve our services. Please take a few " minutes to complete the written survey that you may receive in the mail after your visit with us. Thank you!             Your Updated Medication List - Protect others around you: Learn how to safely use, store and throw away your medicines at www.disposemymeds.org.          This list is accurate as of 10/31/18  2:23 PM.  Always use your most recent med list.                   Brand Name Dispense Instructions for use Diagnosis    traZODone 50 MG tablet    DESYREL    90 tablet    Take 1/2-1 tab at bedtime as needed for sleep    Insomnia, unspecified type

## 2018-11-02 LAB — COPATH REPORT: NORMAL

## 2018-11-14 ENCOUNTER — MYC MEDICAL ADVICE (OUTPATIENT)
Dept: OBGYN | Facility: CLINIC | Age: 53
End: 2018-11-14

## 2018-11-14 NOTE — TELEPHONE ENCOUNTER
I think I see what she is asking, but (unfortunately) once there is an abnormal finding, a procedure (colp) is not preventive.  Could you explain to her the difference between screening procedures (like paps and colonoscopies and mammograms in asymptomatic women) and procedures to investigate an abnormality?  Or perhaps Nuvia might need to talk with her? Thanks.

## 2018-11-15 NOTE — TELEPHONE ENCOUNTER
Attempted to reach patient.  LM for her to call back or she can wait for my call tomorrow at 12:00

## 2018-11-16 NOTE — TELEPHONE ENCOUNTER
Attempted to reach patient.  When going into chart, saw that patient reached out via [employer] in afternoon on 11/15. Matter resolved.  Closing encounter.

## 2019-09-09 ENCOUNTER — TELEPHONE (OUTPATIENT)
Dept: FAMILY MEDICINE | Facility: CLINIC | Age: 54
End: 2019-09-09

## 2019-09-09 DIAGNOSIS — Z12.31 ENCOUNTER FOR SCREENING MAMMOGRAM FOR BREAST CANCER: Primary | ICD-10-CM

## 2019-09-09 NOTE — TELEPHONE ENCOUNTER
Order for screening mammo with any indicated follow-up per radiology is pended.  Please sign if you agree.    I did call patient to make sure she is not having any current brest symptoms and she denied any symptoms.  Just wants screening mammo ordered.  Marie Lambert RN

## 2019-09-09 NOTE — TELEPHONE ENCOUNTER
Reason for Call: Request for an order or referral:    Order or referral being requested: MAMMOGRAM    Date needed: as soon as possible    Has the patient been seen by the PCP for this problem? Not Applicable    Additional comments: Pt is wanting this done as a preventative screening.     Phone number Patient can be reached at:  Home number on file [phone] (home)    Best Time:  anytime    Can we leave a detailed message on this number?  YES    Call taken on 9/9/2019 at 10:50 AM by Teena Hooper

## 2019-09-30 ENCOUNTER — HEALTH MAINTENANCE LETTER (OUTPATIENT)
Age: 54
End: 2019-09-30

## 2019-10-18 ASSESSMENT — ENCOUNTER SYMPTOMS
HEARTBURN: 0
DIZZINESS: 0
JOINT SWELLING: 0
HEMATURIA: 0
DYSURIA: 0
SORE THROAT: 0
FREQUENCY: 0
COUGH: 0
CHILLS: 0
HEADACHES: 0
CONSTIPATION: 1
ABDOMINAL PAIN: 0
BREAST MASS: 0
WEAKNESS: 0
EYE PAIN: 0
PARESTHESIAS: 0
FEVER: 0
PALPITATIONS: 0
HEMATOCHEZIA: 0
NAUSEA: 0
DIARRHEA: 0
NERVOUS/ANXIOUS: 0
SHORTNESS OF BREATH: 0
ARTHRALGIAS: 0
MYALGIAS: 0

## 2019-10-21 ENCOUNTER — OFFICE VISIT (OUTPATIENT)
Dept: FAMILY MEDICINE | Facility: CLINIC | Age: 54
End: 2019-10-21
Payer: COMMERCIAL

## 2019-10-21 VITALS
RESPIRATION RATE: 16 BRPM | HEART RATE: 60 BPM | BODY MASS INDEX: 25.55 KG/M2 | WEIGHT: 159 LBS | DIASTOLIC BLOOD PRESSURE: 74 MMHG | TEMPERATURE: 97.3 F | OXYGEN SATURATION: 100 % | SYSTOLIC BLOOD PRESSURE: 122 MMHG | HEIGHT: 66 IN

## 2019-10-21 DIAGNOSIS — R87.810 CERVICAL HIGH RISK HPV (HUMAN PAPILLOMAVIRUS) TEST POSITIVE: ICD-10-CM

## 2019-10-21 DIAGNOSIS — Z00.00 ROUTINE GENERAL MEDICAL EXAMINATION AT A HEALTH CARE FACILITY: Primary | ICD-10-CM

## 2019-10-21 DIAGNOSIS — Z13.220 LIPID SCREENING: ICD-10-CM

## 2019-10-21 DIAGNOSIS — Z23 NEED FOR VACCINATION: ICD-10-CM

## 2019-10-21 DIAGNOSIS — Z13.1 SCREENING FOR DIABETES MELLITUS: ICD-10-CM

## 2019-10-21 DIAGNOSIS — G47.00 INSOMNIA, UNSPECIFIED TYPE: ICD-10-CM

## 2019-10-21 LAB
CHOLEST SERPL-MCNC: 199 MG/DL
GLUCOSE SERPL-MCNC: 92 MG/DL (ref 70–99)
HBA1C MFR BLD: 5.3 % (ref 0–5.6)
HDLC SERPL-MCNC: 73 MG/DL
LDLC SERPL CALC-MCNC: 111 MG/DL
NONHDLC SERPL-MCNC: 125 MG/DL
TRIGL SERPL-MCNC: 72 MG/DL

## 2019-10-21 PROCEDURE — 90472 IMMUNIZATION ADMIN EACH ADD: CPT | Performed by: FAMILY MEDICINE

## 2019-10-21 PROCEDURE — 36415 COLL VENOUS BLD VENIPUNCTURE: CPT | Performed by: FAMILY MEDICINE

## 2019-10-21 PROCEDURE — 80061 LIPID PANEL: CPT | Performed by: FAMILY MEDICINE

## 2019-10-21 PROCEDURE — 83036 HEMOGLOBIN GLYCOSYLATED A1C: CPT | Performed by: FAMILY MEDICINE

## 2019-10-21 PROCEDURE — 90471 IMMUNIZATION ADMIN: CPT | Performed by: FAMILY MEDICINE

## 2019-10-21 PROCEDURE — 88175 CYTOPATH C/V AUTO FLUID REDO: CPT | Performed by: FAMILY MEDICINE

## 2019-10-21 PROCEDURE — 87624 HPV HI-RISK TYP POOLED RSLT: CPT | Performed by: FAMILY MEDICINE

## 2019-10-21 PROCEDURE — 82947 ASSAY GLUCOSE BLOOD QUANT: CPT | Performed by: FAMILY MEDICINE

## 2019-10-21 PROCEDURE — 99396 PREV VISIT EST AGE 40-64: CPT | Mod: 25 | Performed by: FAMILY MEDICINE

## 2019-10-21 PROCEDURE — 90686 IIV4 VACC NO PRSV 0.5 ML IM: CPT | Performed by: FAMILY MEDICINE

## 2019-10-21 PROCEDURE — 90715 TDAP VACCINE 7 YRS/> IM: CPT | Performed by: FAMILY MEDICINE

## 2019-10-21 RX ORDER — TRAZODONE HYDROCHLORIDE 50 MG/1
50 TABLET, FILM COATED ORAL
Qty: 90 TABLET | Refills: 1 | Status: SHIPPED | OUTPATIENT
Start: 2019-10-21 | End: 2021-03-10

## 2019-10-21 ASSESSMENT — ENCOUNTER SYMPTOMS
HEMATOCHEZIA: 0
COUGH: 0
PALPITATIONS: 0
ARTHRALGIAS: 0
DIZZINESS: 0
CONSTIPATION: 1
JOINT SWELLING: 0
DYSURIA: 0
FREQUENCY: 0
NAUSEA: 0
HEADACHES: 0
HEARTBURN: 0
BREAST MASS: 0
SHORTNESS OF BREATH: 0
NERVOUS/ANXIOUS: 0
HEMATURIA: 0
MYALGIAS: 0
WEAKNESS: 0
ABDOMINAL PAIN: 0
DIARRHEA: 0
CHILLS: 0
PARESTHESIAS: 0
EYE PAIN: 0
SORE THROAT: 0
FEVER: 0

## 2019-10-21 ASSESSMENT — MIFFLIN-ST. JEOR: SCORE: 1327.97

## 2019-10-21 NOTE — NURSING NOTE
Clinic Administered Medication Documentation    MEDICATION LIST:   Injectable Medication Documentation    Patient was given Fluzone. Prior to medication administration, verified patients identity using patient s name and date of birth. Please see MAR and medication order for additional information. Patient instructed to remain in clinic for 15 minutes and report any adverse reaction to staff immediately .      Was entire vial of medication used? Yes  Vial/Syringe: Syringe  Expiration Date:  06/30/2020  Was this medication supplied by the patient? No     Hollie Claros MA on 10/21/2019 at 9:04 AM    Clinic Administered Medication Documentation    MEDICATION LIST:   Injectable Medication Documentation    Patient was given TDAP. Prior to medication administration, verified patients identity using patient s name and date of birth. Please see MAR and medication order for additional information. Patient instructed to remain in clinic for 15 minutes and report any adverse reaction to staff immediately .      Was entire vial of medication used? Yes  Vial/Syringe: Syringe  Expiration Date:  07/04/2021  Was this medication supplied by the patient? No     Prior to immunization administration, verified patients identity using patient s name and date of birth. Please see Immunization Activity for additional information.     Screening Questionnaire for Adult Immunization    Are you sick today?   No   Do you have allergies to medications, food, a vaccine component or latex?   No   Have you ever had a serious reaction after receiving a vaccination?   No   Do you have a long-term health problem with heart disease, lung disease, asthma, kidney disease, metabolic disease (e.g. diabetes), anemia, or other blood disorder?   No   Do you have cancer, leukemia, HIV/AIDS, or any other immune system problem?   No   In the past 3 months, have you taken medications that affect  your immune system, such as prednisone, other steroids, or  anticancer drugs; drugs for the treatment of rheumatoid arthritis, Crohn s disease, or psoriasis; or have you had radiation treatments?   No   Have you had a seizure, or a brain or other nervous system problem?   No   During the past year, have you received a transfusion of blood or blood     products, or been given immune (gamma) globulin or antiviral drug?   No   For women: Are you pregnant or is there a chance you could become        pregnant during the next month?   No   Have you received any vaccinations in the past 4 weeks?   No     Immunization questionnaire answers were all negative.        Per orders of Dr. Reddy, injection of TDAP given by Hollie Claros MA. Patient instructed to remain in clinic for 15 minutes afterwards, and to report any adverse reaction to me immediately.       Screening performed by Hollie Claros MA on 10/21/2019 at 9:37 AM.

## 2019-10-21 NOTE — PROGRESS NOTES
SUBJECTIVE:   CC: Elaina Farris is an 54 year old woman who presents for preventive health visit.     Healthy Habits:     Getting at least 3 servings of Calcium per day:  Yes    Bi-annual eye exam:  Yes    Dental care twice a year:  Yes    Sleep apnea or symptoms of sleep apnea:  None    Diet:  Regular (no restrictions)    Frequency of exercise:  4-5 days/week    Duration of exercise:  45-60 minutes    Taking medications regularly:  Not Applicable    Medication side effects:  Not applicable    PHQ-2 Total Score: 0    Additional concerns today:  No        Today's PHQ-2 Score:   PHQ-2 (  [employer]) 10/18/2019   Q1: Little interest or pleasure in doing things 0   Q2: Feeling down, depressed or hopeless 0   PHQ-2 Score 0   Q1: Little interest or pleasure in doing things Not at all   Q2: Feeling down, depressed or hopeless Not at all   PHQ-2 Score 0       Abuse: Current or Past(Physical, Sexual or Emotional)- No  Do you feel safe in your environment? Yes    Social History     Tobacco Use     Smoking status: Former Smoker     Packs/day: 1.00     Years: 25.00     Pack years: 25.00     Types: Cigarettes     Start date: 1978     Last attempt to quit: 2003     Years since quittin.8     Smokeless tobacco: Never Used   Substance Use Topics     Alcohol use: Yes     Alcohol/week: 60.0 standard drinks     Comment: Less than 3 a week         Alcohol Use 10/18/2019   Prescreen: >3 drinks/day or >7 drinks/week? No   Prescreen: >3 drinks/day or >7 drinks/week? -       Reviewed orders with patient.  Reviewed health maintenance and updated orders accordingly - Yes  BP Readings from Last 3 Encounters:   10/21/19 122/74   10/31/18 (!) 143/94   18 107/75    Wt Readings from Last 3 Encounters:   10/21/19 72.1 kg (159 lb)   10/31/18 72.3 kg (159 lb 8 oz)   18 69.9 kg (154 lb)                Pertinent mammograms are reviewed under the imaging tab.  History of abnormal Pap smear: YES - updated in Problem List  and Health Maintenance accordingly  PAP / HPV Latest Ref Rng & Units 2018   PAP - NIL NIL NIL   HPV 16 DNA NEG:Negative Negative Negative Negative   HPV 18 DNA NEG:Negative Negative Negative Negative   OTHER HR HPV NEG:Negative Positive(A) Positive(A) Positive(A)     Reviewed and updated as needed this visit by clinical staff  Tobacco  Allergies  Meds  Problems  Med Hx  Surg Hx  Fam Hx  Soc Hx          Reviewed and updated as needed this visit by Provider  Meds  Problems        Past Medical History:   Diagnosis Date     Anxiety disorder      Bulimia nervosa      Cervical high risk HPV (human papillomavirus) test positive 16, 17, 18    See problem list     Irritable bowel syndrome      Major depression      Menopause 2011     Other specified genital prolapse(618.89)       Past Surgical History:   Procedure Laterality Date     C INCISION EXTEN FOOT/TOE TENDON      toe shortened     COLONOSCOPY  10/9/13    rpt 10 yrs     LASIK  2004    bilateral     OB History    Para Term  AB Living   2 2 2 0 0 2   SAB TAB Ectopic Multiple Live Births   0 0 0 0 2      # Outcome Date GA Lbr Jonathan/2nd Weight Sex Delivery Anes PTL Lv   2 Term 95 40w0d       MOMO   1 Term 93 40w0d       MOMO      Obstetric Comments    x 2       Review of Systems   Constitutional: Negative for chills and fever.   HENT: Negative for congestion, ear pain, hearing loss and sore throat.    Eyes: Negative for pain and visual disturbance.   Respiratory: Negative for cough and shortness of breath.    Cardiovascular: Negative for chest pain, palpitations and peripheral edema.   Gastrointestinal: Positive for constipation. Negative for abdominal pain, diarrhea, heartburn, hematochezia and nausea.   Breasts:  Negative for tenderness, breast mass and discharge.   Genitourinary: Negative for dysuria, frequency, genital sores, hematuria, pelvic pain, urgency, vaginal bleeding and  "vaginal discharge.   Musculoskeletal: Negative for arthralgias, joint swelling and myalgias.   Skin: Negative for rash.   Neurological: Negative for dizziness, weakness, headaches and paresthesias.   Psychiatric/Behavioral: Negative for mood changes. The patient is not nervous/anxious.           OBJECTIVE:   /74 (BP Location: Left arm, Patient Position: Sitting, Cuff Size: Adult Regular)   Pulse 60   Temp 97.3  F (36.3  C) (Tympanic)   Resp 16   Ht 1.676 m (5' 6\")   Wt 72.1 kg (159 lb)   LMP 05/26/2011   SpO2 100%   Breastfeeding? No   BMI 25.66 kg/m    Physical Exam  GENERAL APPEARANCE: healthy, alert and no distress  EYES: Eyes grossly normal to inspection, PERRL and conjunctivae and sclerae normal  HENT: ear canals and TM's normal, nose and mouth without ulcers or lesions, oropharynx clear and oral mucous membranes moist  NECK: no adenopathy, no asymmetry, masses, or scars and thyroid normal to palpation  RESP: lungs clear to auscultation - no rales, rhonchi or wheezes  BREAST: normal without masses, tenderness or nipple discharge and no palpable axillary masses or adenopathy  CV: regular rate and rhythm, normal S1 S2, no S3 or S4, no murmur, click or rub, no peripheral edema and peripheral pulses strong  ABDOMEN: soft, nontender, no hepatosplenomegaly, no masses and bowel sounds normal   (female): normal female external genitalia, normal urethral meatus, vaginal mucosal atrophy noted, normal cervix, adnexae, and uterus without masses or abnormal discharge  MS: no musculoskeletal defects are noted and gait is age appropriate without ataxia  SKIN: no suspicious lesions or rashes  NEURO: Normal strength and tone, sensory exam grossly normal, mentation intact and speech normal  PSYCH: mentation appears normal and affect normal/bright    Diagnostic Test Results:  Labs reviewed in Epic    ASSESSMENT/PLAN:       ICD-10-CM    1. Routine general medical examination at a health care facility Z00.00    2. " Cervical high risk HPV (human papillomavirus) test positive R87.810 Pap imaged thin layer diagnostic with HPV (select HPV order below)     HPV High Risk Types DNA Cervical   3. Need for vaccination Z23 HC FLU VAC PRESRV FREE QUAD SPLIT VIR > 6 MONTHS IM [[id number]]     TDAP, IM (10 - 64 YRS) - Adacel     ADMIN 1st VACCINE     EA ADD'L VACCINE   4. Screening for diabetes mellitus Z13.1 Glucose     Hemoglobin A1c   5. Lipid screening Z13.220 Lipid panel reflex to direct LDL Fasting   6. Insomnia, unspecified type G47.00 traZODone (DESYREL) 50 MG tablet       COUNSELING:  Reviewed preventive health counseling, as reflected in patient instructions        Counseling Resources:  ATP IV Guidelines  Pooled Cohorts Equation Calculator  Breast Cancer Risk Calculator  FRAX Risk Assessment  ICSI Preventive Guidelines  Dietary Guidelines for Americans, 2010  USDA's MyPlate  ASA Prophylaxis  Lung CA Screening    Bessy Reddy MD  [facility]

## 2019-10-22 ENCOUNTER — TELEPHONE (OUTPATIENT)
Dept: FAMILY MEDICINE | Facility: CLINIC | Age: 54
End: 2019-10-22

## 2019-10-22 NOTE — TELEPHONE ENCOUNTER
Pt needs to sign [employer] form in order to fax the form.   Placed form in longfellow basket until pt calls back     Heavenly Francois CMA on 10/22/2019 at 3:57 PM

## 2019-10-23 LAB
COPATH REPORT: NORMAL
PAP: NORMAL

## 2019-10-24 LAB
FINAL DIAGNOSIS: NORMAL
HPV HR 12 DNA CVX QL NAA+PROBE: NEGATIVE
HPV16 DNA SPEC QL NAA+PROBE: NEGATIVE
HPV18 DNA SPEC QL NAA+PROBE: NEGATIVE
SPECIMEN DESCRIPTION: NORMAL
SPECIMEN SOURCE CVX/VAG CYTO: NORMAL

## 2019-10-30 ENCOUNTER — ANCILLARY PROCEDURE (OUTPATIENT)
Dept: MAMMOGRAPHY | Facility: CLINIC | Age: 54
End: 2019-10-30
Attending: FAMILY MEDICINE
Payer: COMMERCIAL

## 2019-10-30 DIAGNOSIS — Z12.31 ENCOUNTER FOR SCREENING MAMMOGRAM FOR BREAST CANCER: ICD-10-CM

## 2019-10-30 PROCEDURE — 77067 SCR MAMMO BI INCL CAD: CPT | Mod: TC

## 2019-10-30 PROCEDURE — 77063 BREAST TOMOSYNTHESIS BI: CPT | Mod: TC

## 2020-05-26 ENCOUNTER — OFFICE VISIT (OUTPATIENT)
Dept: URGENT CARE | Facility: URGENT CARE | Age: 55
End: 2020-05-26
Payer: COMMERCIAL

## 2020-05-26 ENCOUNTER — ANCILLARY PROCEDURE (OUTPATIENT)
Dept: GENERAL RADIOLOGY | Facility: CLINIC | Age: 55
End: 2020-05-26
Attending: INTERNAL MEDICINE
Payer: COMMERCIAL

## 2020-05-26 VITALS
WEIGHT: 160 LBS | SYSTOLIC BLOOD PRESSURE: 121 MMHG | DIASTOLIC BLOOD PRESSURE: 75 MMHG | HEART RATE: 68 BPM | TEMPERATURE: 99.4 F | HEIGHT: 66 IN | BODY MASS INDEX: 25.71 KG/M2 | OXYGEN SATURATION: 98 %

## 2020-05-26 DIAGNOSIS — S99.912A ANKLE INJURY, LEFT, INITIAL ENCOUNTER: Primary | ICD-10-CM

## 2020-05-26 DIAGNOSIS — S99.912A ANKLE INJURY, LEFT, INITIAL ENCOUNTER: ICD-10-CM

## 2020-05-26 PROCEDURE — 99214 OFFICE O/P EST MOD 30 MIN: CPT | Performed by: INTERNAL MEDICINE

## 2020-05-26 PROCEDURE — 73610 X-RAY EXAM OF ANKLE: CPT | Mod: LT

## 2020-05-26 RX ORDER — IBUPROFEN 200 MG
200 TABLET ORAL EVERY 4 HOURS PRN
COMMUNITY
End: 2020-10-18

## 2020-05-26 ASSESSMENT — MIFFLIN-ST. JEOR: SCORE: 1327.51

## 2020-05-26 NOTE — PATIENT INSTRUCTIONS
ice, elevate, rest, splint: tie up splint and Ibuprofen    If not improving as expected over next few weeks, please see podiatry/foot surgeon.    Sprains can take 4 to 6 weeks to heal.    In the next few days start range of motion with drying ABCs with your foot.          Patient Education     Self-Care for Strains and Sprains  Most minor strains and sprains can be treated with self-care. Recovering from a strain or sprain may take 6 to 8 weeks. Your self-care goal is to reduce pain and immobilize the injury to speed healing.     A sprain injures ligaments (tissue that connects bones to bones).      A strain injures muscles or tendons (tissue that connects muscles to bones).   Support the injured area  Wrapping the injured area provides support for short, necessary activities. Be careful not to wrap the area too tightly. This could cut off the blood supply.    Support a wrist, elbow, or shoulder with a sling.    Wrap an ankle or knee with an elastic bandage.    Tape a finger or toe to the one next to it.  Use cold and heat  Cold reduces swelling. Both cold and heat reduce pain. Heat should not be used in the initial treatment of the injury. When using cold or heat, always place a thin towel between the pack and your skin.    Apply ice or a cold pack 10 to 15 minutes every hour you re awake for the first 2 days.    After the swelling goes down, use cold or heat to control pain. Don t use heat late in the day, since it can cause swelling when you re not active.  Rest and elevate  Rest and elevation help your injury heal faster.    Raise the injured area above your heart level.    Keep the injured area from moving.    Limit the use of the joint or limb.  Use medicine    Aspirin reduces pain and swelling. (Note: Don t give aspirin to a child 18 or younger unless prescribed by the doctor.)    Non-steroidal anti-inflammatory medicines, such as ibuprofen, may reduce pain and swelling, as well. Ask your healthcare provider  for advice.  When to call your healthcare provider  Call your healthcare provider if:    The injured joint won t move, or bones make a grating sound when they move    You can t put weight on the injured area, even after 24 hours    The injured body part is cold, blue, tingling, or numb    The joint or limb appears bent or crooked.    Pain increases or doesn t improve in 4 days    When pressing along the injured area, you notice a spot that is especially painful  Date Last Reviewed: 5/1/2018 2000-2019 [employer] [address] [place], [place] [id number]. All rights reserved. This information is not intended as a substitute for professional medical care. Always follow your healthcare professional's instructions.

## 2020-05-26 NOTE — PROGRESS NOTES
SUBJECTIVE:   Elaina Farris is a 55 year old female presenting with a chief complaint of   Chief Complaint   Patient presents with     Urgent Care     Pt in clinic to have eval for left ankle pain.     Trauma       She is an established patient of [facility].    MS Injury/Pain    Onset of symptoms was 1 day(s) ago.  Location: left ankle  Context:       The injury happened while playing VB      Mechanism: landing on left foot twisting it      Patient experienced immediate pain, was able to bear weight directly after injury, no deformity was noted by the patient  Course of symptoms is worsening.      Current and Associated symptoms: Pain, Swelling and numbness of lower leg foot    Aggravating Factors: walking and weight-bearing  Therapies to improve symptoms include: ice and ibuprofen  This is the first time this type of problem has occurred for this patient.       Review of Systems    Past Medical History:   Diagnosis Date     Anxiety disorder      Bulimia nervosa      Cervical high risk HPV (human papillomavirus) test positive 09/21/16, 09/25/17, 9/13/18    See problem list     Irritable bowel syndrome      Major depression      Menopause 5/2011     Other specified genital prolapse(478.89)      Family History   Problem Relation Age of Onset     Gastrointestinal Disease Mother         gerd     Lipids Mother      Diabetes Maternal Grandmother         diet     Heart Disease Paternal Grandfather      Lipids Paternal Grandfather      Hypertension Father      Alcohol/Drug Father         alcohol     Lipids Father      Cancer Father         renal     Diabetes Brother         oral     Hypertension Brother      Substance Abuse Brother      Hyperlipidemia Brother      Asthma Brother      Breast Cancer Maternal Aunt      Alcohol/Drug Brother         alcohol     Hypertension Brother      Hyperlipidemia Brother      Gynecology Sister         polycystic overys     GERD Sister      Asthma Sister      Depression Sister       "Respiratory Daughter         asthma     Respiratory Daughter         asthma     Breast Cancer Other      Current Outpatient Medications   Medication Sig Dispense Refill     ibuprofen (ADVIL/MOTRIN) 200 MG tablet Take 200 mg by mouth every 4 hours as needed for mild pain       order for DME Equipment being ordered: tie up ankle splint 1 Device 0     traZODone (DESYREL) 50 MG tablet Take 1 tablet (50 mg) by mouth nightly as needed for sleep 90 tablet 1     Social History     Tobacco Use     Smoking status: Former Smoker     Packs/day: 1.00     Years: 25.00     Pack years: 25.00     Types: Cigarettes     Start date: 1978     Last attempt to quit: 2003     Years since quittin.4     Smokeless tobacco: Never Used   Substance Use Topics     Alcohol use: Yes     Alcohol/week: 60.0 standard drinks     Comment: Less than 3 a week       OBJECTIVE  /75   Pulse 68   Temp 99.4  F (37.4  C) (Oral)   Ht 1.676 m (5' 6\")   Wt 72.6 kg (160 lb)   LMP 2011   SpO2 98%   BMI 25.82 kg/m      Physical Exam  Vitals signs reviewed.   Constitutional:       Appearance: Normal appearance.   Musculoskeletal:      Comments: left lower leg  Exam of the injured ankle reveals swelling and tenderness over the lateral malleolus. No tenderness over the medial aspect of the ankle. The fifth metatarsal is not tender.   The rest of the foot, ankle and leg exam is normal.     Neurological:      Mental Status: She is alert.      Comments: Numbness on top of foot noted per patient          Labs:  No results found for this or any previous visit (from the past 24 hour(s)).    X-Ray was done, my findings are: no evidence of fracture     ASSESSMENT:      ICD-10-CM    1. Ankle injury, left, initial encounter  S99.912A XR Ankle Left G/E 3 Views     order for DME        Medical Decision Making:    Differential Diagnosis:  MS Injury Pain: sprain and fracture    Serious Comorbid Conditions:  Adult:  None    PLAN:    MS Injury/Pain "   Followup:    If not improving or if condition worsens, follow up with your Primary Care Provider    Patient Instructions      ice, elevate, rest, splint: tie up splint and Ibuprofen    If not improving as expected over next few weeks, please see podiatry/foot surgeon.    Sprains can take 4 to 6 weeks to heal.    In the next few days start range of motion with drying ABCs with your foot.          Patient Education     Self-Care for Strains and Sprains  Most minor strains and sprains can be treated with self-care. Recovering from a strain or sprain may take 6 to 8 weeks. Your self-care goal is to reduce pain and immobilize the injury to speed healing.     A sprain injures ligaments (tissue that connects bones to bones).      A strain injures muscles or tendons (tissue that connects muscles to bones).   Support the injured area  Wrapping the injured area provides support for short, necessary activities. Be careful not to wrap the area too tightly. This could cut off the blood supply.    Support a wrist, elbow, or shoulder with a sling.    Wrap an ankle or knee with an elastic bandage.    Tape a finger or toe to the one next to it.  Use cold and heat  Cold reduces swelling. Both cold and heat reduce pain. Heat should not be used in the initial treatment of the injury. When using cold or heat, always place a thin towel between the pack and your skin.    Apply ice or a cold pack 10 to 15 minutes every hour you re awake for the first 2 days.    After the swelling goes down, use cold or heat to control pain. Don t use heat late in the day, since it can cause swelling when you re not active.  Rest and elevate  Rest and elevation help your injury heal faster.    Raise the injured area above your heart level.    Keep the injured area from moving.    Limit the use of the joint or limb.  Use medicine    Aspirin reduces pain and swelling. (Note: Don t give aspirin to a child 18 or younger unless prescribed by the  doctor.)    Non-steroidal anti-inflammatory medicines, such as ibuprofen, may reduce pain and swelling, as well. Ask your healthcare provider for advice.  When to call your healthcare provider  Call your healthcare provider if:    The injured joint won t move, or bones make a grating sound when they move    You can t put weight on the injured area, even after 24 hours    The injured body part is cold, blue, tingling, or numb    The joint or limb appears bent or crooked.    Pain increases or doesn t improve in 4 days    When pressing along the injured area, you notice a spot that is especially painful  Date Last Reviewed: 5/1/2018 2000-2019 [employer]. [address] [place] [address], [place] [address]. All rights reserved. This information is not intended as a substitute for professional medical care. Always follow your healthcare professional's instructions.

## 2020-06-29 ENCOUNTER — OFFICE VISIT (OUTPATIENT)
Dept: URGENT CARE | Facility: URGENT CARE | Age: 55
End: 2020-06-29
Payer: COMMERCIAL

## 2020-06-29 VITALS
BODY MASS INDEX: 24.91 KG/M2 | TEMPERATURE: 98.3 F | DIASTOLIC BLOOD PRESSURE: 75 MMHG | HEIGHT: 66 IN | OXYGEN SATURATION: 100 % | SYSTOLIC BLOOD PRESSURE: 117 MMHG | HEART RATE: 65 BPM | WEIGHT: 155 LBS

## 2020-06-29 DIAGNOSIS — H61.21 IMPACTED CERUMEN OF RIGHT EAR: Primary | ICD-10-CM

## 2020-06-29 PROCEDURE — 69209 REMOVE IMPACTED EAR WAX UNI: CPT | Mod: RT | Performed by: PHYSICIAN ASSISTANT

## 2020-06-29 PROCEDURE — 99207 ZZC DROP WITH A PROCEDURE: CPT | Mod: 25 | Performed by: PHYSICIAN ASSISTANT

## 2020-06-29 ASSESSMENT — MIFFLIN-ST. JEOR: SCORE: 1304.83

## 2020-06-29 NOTE — PATIENT INSTRUCTIONS
Patient was educated on the natural  course of the condition. Conservative measures to prevent ear wax build up including applying a few drops of mineral oil or earwax softener (Debrox) were discussed.  Avoid using q tips as this may push ear wax further in to the ear canal.  See your primary care provider if symptoms worsen or do not improve in 7 days. Seek emergency care if you develop severe ear pain or fever over 103.

## 2020-06-29 NOTE — PROGRESS NOTES
"URGENT CARE VISIT:    SUBJECTIVE:   Elaina Farris is a 55 year old female presenting with a chief complaint of right  decreased hearing.  Onset was 10 day(s) ago while on vacation.   She denies the following symptoms: ear pain, fever, chills and stuffy nose  Course of illness is same.    Treatment measures tried include flushing with water and OTC ear drops with no relief of symptoms.  Predisposing factors include wears ear buds.    PMH:   Past Medical History:   Diagnosis Date     Anxiety disorder      Bulimia nervosa      Cervical high risk HPV (human papillomavirus) test positive 16, 17, 18    See problem list     Irritable bowel syndrome      Major depression      Menopause 2011     Other specified genital prolapse(618.89)      Allergies: Ambien   Medications:   Current Outpatient Medications   Medication Sig Dispense Refill     ibuprofen (ADVIL/MOTRIN) 200 MG tablet Take 200 mg by mouth every 4 hours as needed for mild pain       traZODone (DESYREL) 50 MG tablet Take 1 tablet (50 mg) by mouth nightly as needed for sleep 90 tablet 1     order for DME Equipment being ordered: tie up ankle splint (Patient not taking: Reported on 2020) 1 Device 0     Social History:   Social History     Tobacco Use     Smoking status: Former Smoker     Packs/day: 1.00     Years: 25.00     Pack years: 25.00     Types: Cigarettes     Start date: 1978     Last attempt to quit: 2003     Years since quittin.5     Smokeless tobacco: Never Used   Substance Use Topics     Alcohol use: Yes     Alcohol/week: 60.0 standard drinks     Comment: Less than 3 a week       ROS:  Review of systems negative except as stated above.    OBJECTIVE:  /75   Pulse 65   Temp 98.3  F (36.8  C) (Oral)   Ht 1.676 m (5' 6\")   Wt 70.3 kg (155 lb)   LMP 2011   SpO2 100%   BMI 25.02 kg/m    GENERAL APPEARANCE: healthy, alert and no distress  EYES: conjunctiva clear  HENT: TM's normal bilaterally.  Copious " cerumen present in right ear. Very small amount of cerumen in left ear.  NECK: supple, nontender, no lymphadenopathy  RESP: lungs clear to auscultation - no rales, rhonchi or wheezes  CV: regular rates and rhythm, normal S1 S2, no murmur noted  SKIN: no suspicious lesions or rashes    ASSESSMENT:    ICD-10-CM    1. Impacted cerumen of right ear  H61.21 HC REMOVAL IMPACTED CERUMEN IRRIGATION/LVG UNILAT       ROS:  Review of systems negative except as stated above.    OBJECTIVE:  /78   Pulse 80   Temp 98  F (36.7  C) (Tympanic)   Resp 20   Wt 98 kg (216 lb)   SpO2 98%   BMI 31.90 kg/m    GENERAL APPEARANCE: healthy, alert and no distress  EYES: EOMI,  PERRL, conjunctiva clear  HENT: TM's normal bilaterally and cerumen bilateral. Left more than right.   RESP: lungs clear to auscultation - no rales, rhonchi or wheezes  CV: regular rates and rhythm, normal S1 S2, no murmur noted  SKIN: no suspicious lesions or rashes    ASSESSMENT:    ICD-10-CM    1. Impacted cerumen of right ear  H61.21 HC REMOVAL IMPACTED CERUMEN IRRIGATION/LVG UNILAT       PROCEDURE: Ear irrigation  Ear wax was successfully removed with irrigation. Patient tolerated the procedure well.     PLAN:  Patient Instructions   Patient was educated on the natural  course of the condition. Conservative measures to prevent ear wax build up including applying a few drops of mineral oil or earwax softener (Debrox) were discussed.  Avoid using q tips as this may push ear wax further in to the ear canal.  See your primary care provider if symptoms worsen or do not improve in 7 days. Seek emergency care if you develop severe ear pain or fever over 103.     Patient verbalized understanding and is agreeable to plan. The patient was discharged ambulatory and in stable condition.    Betsy Hall PA-C on 6/29/2020 at 1:54 PM

## 2020-10-01 ENCOUNTER — MYC MEDICAL ADVICE (OUTPATIENT)
Dept: FAMILY MEDICINE | Facility: CLINIC | Age: 55
End: 2020-10-01

## 2020-10-01 DIAGNOSIS — Z12.31 ENCOUNTER FOR SCREENING MAMMOGRAM FOR MALIGNANT NEOPLASM OF BREAST: Primary | ICD-10-CM

## 2020-10-01 NOTE — TELEPHONE ENCOUNTER
Done.  But we really don't need to place orders for screening mammo.  They can just schedule it.  Bessy Reddy MD

## 2020-10-18 ASSESSMENT — ENCOUNTER SYMPTOMS
EYE PAIN: 0
COUGH: 0
HEMATURIA: 0
CONSTIPATION: 0
CHILLS: 0
ABDOMINAL PAIN: 0
FEVER: 0
BREAST MASS: 0
FREQUENCY: 0
DIZZINESS: 0
DIARRHEA: 0
NERVOUS/ANXIOUS: 0
HEMATOCHEZIA: 0
HEADACHES: 0

## 2020-10-19 ENCOUNTER — OFFICE VISIT (OUTPATIENT)
Dept: FAMILY MEDICINE | Facility: CLINIC | Age: 55
End: 2020-10-19
Payer: COMMERCIAL

## 2020-10-19 VITALS
RESPIRATION RATE: 14 BRPM | TEMPERATURE: 98 F | WEIGHT: 163 LBS | HEART RATE: 56 BPM | BODY MASS INDEX: 26.2 KG/M2 | OXYGEN SATURATION: 98 % | DIASTOLIC BLOOD PRESSURE: 81 MMHG | HEIGHT: 66 IN | SYSTOLIC BLOOD PRESSURE: 124 MMHG

## 2020-10-19 DIAGNOSIS — Z00.00 ROUTINE GENERAL MEDICAL EXAMINATION AT A HEALTH CARE FACILITY: Primary | ICD-10-CM

## 2020-10-19 DIAGNOSIS — Z23 NEED FOR VACCINATION: ICD-10-CM

## 2020-10-19 DIAGNOSIS — Z13.1 SCREENING FOR DIABETES MELLITUS: ICD-10-CM

## 2020-10-19 DIAGNOSIS — Z13.220 LIPID SCREENING: ICD-10-CM

## 2020-10-19 DIAGNOSIS — Z23 NEED FOR PROPHYLACTIC VACCINATION AND INOCULATION AGAINST INFLUENZA: ICD-10-CM

## 2020-10-19 LAB
CHOLEST SERPL-MCNC: 186 MG/DL
GLUCOSE SERPL-MCNC: 103 MG/DL (ref 70–99)
HBA1C MFR BLD: 5.2 % (ref 0–5.6)
HDLC SERPL-MCNC: 61 MG/DL
LDLC SERPL CALC-MCNC: 95 MG/DL
NONHDLC SERPL-MCNC: 125 MG/DL
TRIGL SERPL-MCNC: 149 MG/DL

## 2020-10-19 PROCEDURE — 83036 HEMOGLOBIN GLYCOSYLATED A1C: CPT | Performed by: FAMILY MEDICINE

## 2020-10-19 PROCEDURE — 36415 COLL VENOUS BLD VENIPUNCTURE: CPT | Performed by: FAMILY MEDICINE

## 2020-10-19 PROCEDURE — 90471 IMMUNIZATION ADMIN: CPT | Performed by: FAMILY MEDICINE

## 2020-10-19 PROCEDURE — 90472 IMMUNIZATION ADMIN EACH ADD: CPT | Performed by: FAMILY MEDICINE

## 2020-10-19 PROCEDURE — 82947 ASSAY GLUCOSE BLOOD QUANT: CPT | Performed by: FAMILY MEDICINE

## 2020-10-19 PROCEDURE — 99396 PREV VISIT EST AGE 40-64: CPT | Mod: 25 | Performed by: FAMILY MEDICINE

## 2020-10-19 PROCEDURE — 80061 LIPID PANEL: CPT | Performed by: FAMILY MEDICINE

## 2020-10-19 PROCEDURE — 90682 RIV4 VACC RECOMBINANT DNA IM: CPT | Performed by: FAMILY MEDICINE

## 2020-10-19 PROCEDURE — 90750 HZV VACC RECOMBINANT IM: CPT | Performed by: FAMILY MEDICINE

## 2020-10-19 SDOH — HEALTH STABILITY: MENTAL HEALTH: HOW MANY STANDARD DRINKS CONTAINING ALCOHOL DO YOU HAVE ON A TYPICAL DAY?: 1 OR 2

## 2020-10-19 SDOH — HEALTH STABILITY: MENTAL HEALTH: HOW OFTEN DO YOU HAVE A DRINK CONTAINING ALCOHOL?: 2-3 TIMES A WEEK

## 2020-10-19 SDOH — HEALTH STABILITY: MENTAL HEALTH: HOW OFTEN DO YOU HAVE 6 OR MORE DRINKS ON ONE OCCASION?: NOT ASKED

## 2020-10-19 ASSESSMENT — MIFFLIN-ST. JEOR: SCORE: 1341.11

## 2020-10-21 NOTE — RESULT ENCOUNTER NOTE
Otis Delaney,  Your fasting blood sugar is a tad elevated but your Hemoglobin A1C (a test assessing overall blood sugar control for the last 3 months) is excellent.  I'll complete your form.      Bessy Reddy MD

## 2020-11-02 ENCOUNTER — ANCILLARY PROCEDURE (OUTPATIENT)
Dept: MAMMOGRAPHY | Facility: CLINIC | Age: 55
End: 2020-11-02
Attending: FAMILY MEDICINE
Payer: COMMERCIAL

## 2020-11-02 DIAGNOSIS — Z12.31 ENCOUNTER FOR SCREENING MAMMOGRAM FOR MALIGNANT NEOPLASM OF BREAST: ICD-10-CM

## 2020-11-02 PROCEDURE — 77063 BREAST TOMOSYNTHESIS BI: CPT | Performed by: RADIOLOGY

## 2020-11-02 PROCEDURE — 77067 SCR MAMMO BI INCL CAD: CPT | Performed by: RADIOLOGY

## 2020-11-30 ENCOUNTER — MYC MEDICAL ADVICE (OUTPATIENT)
Dept: FAMILY MEDICINE | Facility: CLINIC | Age: 55
End: 2020-11-30

## 2020-12-02 NOTE — TELEPHONE ENCOUNTER
Writer mailed copy of [facility] form into mail as requested. Form found under media tab dated 10/30/2020. Patient updated via [employer].      Thanks! Nisha Morris RN

## 2020-12-10 ENCOUNTER — TRANSFERRED RECORDS (OUTPATIENT)
Dept: HEALTH INFORMATION MANAGEMENT | Facility: CLINIC | Age: 55
End: 2020-12-10

## 2021-07-15 ENCOUNTER — TRANSFERRED RECORDS (OUTPATIENT)
Dept: HEALTH INFORMATION MANAGEMENT | Facility: CLINIC | Age: 56
End: 2021-07-15

## 2021-07-24 ASSESSMENT — ENCOUNTER SYMPTOMS
CHILLS: 0
MYALGIAS: 0
SHORTNESS OF BREATH: 0
PALPITATIONS: 0
DYSURIA: 0
HEMATOCHEZIA: 0
FREQUENCY: 0
HEARTBURN: 0
HEMATURIA: 0
NAUSEA: 0
CONSTIPATION: 0
PARESTHESIAS: 0
HEADACHES: 0
SORE THROAT: 0
BREAST MASS: 0
COUGH: 0
DIZZINESS: 0
NERVOUS/ANXIOUS: 0
DIARRHEA: 0
JOINT SWELLING: 0
EYE PAIN: 0
ARTHRALGIAS: 0
WEAKNESS: 0
ABDOMINAL PAIN: 0
FEVER: 0

## 2021-07-25 NOTE — PATIENT INSTRUCTIONS
Did you know?   I do telehealth (video) visits exclusively on Wednesdays.  I still do in-person visits at [facility] ([phone]) on Mondays, Tuesdays and Thursdays.  You can schedule a video visit for follow-up appointments as well as future appointments for certain conditions.  Please see the below link.  [email or web address]    If you have not already done so,  I encourage you to sign up for Topicmarkshart ([email or web address]).  This will allow you to review your results, securely communicate with your provider care team, and schedule virtual visits as well.    To schedule any ordered imaging studies you can call [facility] at [phone].  If you are scheduling a DEXA (bone density) scan please do NOT schedule this at the [facility] [place].  Please ask that it be done at [facility] in [place].  Other preferred DEXA locations include [place] (at the [facility]), [place], or [place].        Preventive Health Recommendations  Female Ages 50 - 64    Yearly exam: See your health care provider every year in order to  o Review health changes.   o Discuss preventive care.    o Review your medicines if your doctor has prescribed any.      Get a Pap test every three years (unless you have an abnormal result and your provider advises testing more often).    If you get Pap tests with HPV test, you only need to test every 5 years, unless you have an abnormal result.     You do not need a Pap test if your uterus was removed (hysterectomy) and you have not had cancer.    You should be tested each year for STDs (sexually transmitted diseases) if you're at risk.     Have a mammogram every 1 to 2 years.    Have a colonoscopy at age 50, or have a yearly FIT test (stool test). These exams screen for colon cancer.      Have a cholesterol test every 5 years, or more often if  advised.    Have a diabetes test (fasting glucose) every three years. If you are at risk for diabetes, you should have this test more often.     If you are at risk for osteoporosis (brittle bone disease), think about having a bone density scan (DEXA).    Shots: Get a flu shot each year. Get a tetanus shot every 10 years.    Nutrition:     Eat at least 5 servings of fruits and vegetables each day.    Eat whole-grain bread, whole-wheat pasta and brown rice instead of white grains and rice.    Get adequate Calcium and Vitamin D.     Lifestyle    Exercise at least 150 minutes a week (30 minutes a day, 5 days a week). This will help you control your weight and prevent disease.    Limit alcohol to one drink per day.    No smoking.     Wear sunscreen to prevent skin cancer.     See your dentist every six months for an exam and cleaning.    See your eye doctor every 1 to 2 years.

## 2021-07-27 ENCOUNTER — OFFICE VISIT (OUTPATIENT)
Dept: FAMILY MEDICINE | Facility: CLINIC | Age: 56
End: 2021-07-27
Payer: COMMERCIAL

## 2021-07-27 VITALS
HEIGHT: 66 IN | BODY MASS INDEX: 25.39 KG/M2 | HEART RATE: 61 BPM | TEMPERATURE: 98 F | WEIGHT: 158 LBS | DIASTOLIC BLOOD PRESSURE: 74 MMHG | RESPIRATION RATE: 16 BRPM | SYSTOLIC BLOOD PRESSURE: 112 MMHG | OXYGEN SATURATION: 100 %

## 2021-07-27 DIAGNOSIS — G47.00 INSOMNIA, UNSPECIFIED TYPE: ICD-10-CM

## 2021-07-27 DIAGNOSIS — Z12.31 ENCOUNTER FOR SCREENING MAMMOGRAM FOR BREAST CANCER: ICD-10-CM

## 2021-07-27 DIAGNOSIS — Z13.220 LIPID SCREENING: ICD-10-CM

## 2021-07-27 DIAGNOSIS — Z00.00 ROUTINE GENERAL MEDICAL EXAMINATION AT A HEALTH CARE FACILITY: Primary | ICD-10-CM

## 2021-07-27 DIAGNOSIS — Z13.1 SCREENING FOR DIABETES MELLITUS: ICD-10-CM

## 2021-07-27 LAB
CHOLEST SERPL-MCNC: 197 MG/DL
FASTING STATUS PATIENT QL REPORTED: YES
FASTING STATUS PATIENT QL REPORTED: YES
GLUCOSE BLD-MCNC: 98 MG/DL (ref 70–99)
HBA1C MFR BLD: 5.2 % (ref 0–5.6)
HDLC SERPL-MCNC: 77 MG/DL
LDLC SERPL CALC-MCNC: 98 MG/DL
NONHDLC SERPL-MCNC: 120 MG/DL
TRIGL SERPL-MCNC: 108 MG/DL

## 2021-07-27 PROCEDURE — 36415 COLL VENOUS BLD VENIPUNCTURE: CPT | Performed by: FAMILY MEDICINE

## 2021-07-27 PROCEDURE — 83036 HEMOGLOBIN GLYCOSYLATED A1C: CPT | Performed by: FAMILY MEDICINE

## 2021-07-27 PROCEDURE — 80061 LIPID PANEL: CPT | Performed by: FAMILY MEDICINE

## 2021-07-27 PROCEDURE — 99396 PREV VISIT EST AGE 40-64: CPT | Performed by: FAMILY MEDICINE

## 2021-07-27 PROCEDURE — 82947 ASSAY GLUCOSE BLOOD QUANT: CPT | Performed by: FAMILY MEDICINE

## 2021-07-27 RX ORDER — TRAZODONE HYDROCHLORIDE 50 MG/1
50 TABLET, FILM COATED ORAL
Qty: 90 TABLET | Refills: 3 | Status: SHIPPED | OUTPATIENT
Start: 2021-07-27 | End: 2022-10-11

## 2021-07-27 ASSESSMENT — ENCOUNTER SYMPTOMS
ARTHRALGIAS: 0
DIZZINESS: 0
EYE PAIN: 0
NERVOUS/ANXIOUS: 0
COUGH: 0
MYALGIAS: 0
DYSURIA: 0
FEVER: 0
SORE THROAT: 0
PALPITATIONS: 0
NAUSEA: 0
BREAST MASS: 0
JOINT SWELLING: 0
DIARRHEA: 0
CONSTIPATION: 0
HEMATOCHEZIA: 0
CHILLS: 0
HEADACHES: 0
PARESTHESIAS: 0
FREQUENCY: 0
ABDOMINAL PAIN: 0
WEAKNESS: 0
HEARTBURN: 0
SHORTNESS OF BREATH: 0
HEMATURIA: 0

## 2021-07-27 ASSESSMENT — MIFFLIN-ST. JEOR: SCORE: 1309.46

## 2021-07-27 NOTE — PROGRESS NOTES
SUBJECTIVE:   CC: Elaina Farris is an 56 year old woman who presents for preventive health visit.   Patient has been advised of split billing requirements and indicates understanding: Yes     Healthy Habits:     Getting at least 3 servings of Calcium per day:  Yes    Bi-annual eye exam:  Yes    Dental care twice a year:  Yes    Sleep apnea or symptoms of sleep apnea:  None    Diet:  Regular (no restrictions)    Frequency of exercise:  4-5 days/week    Duration of exercise:  45-60 minutes    Taking medications regularly:  Yes    Medication side effects:  None    PHQ-2 Total Score: 0    Additional concerns today:  No      Today's PHQ-2 Score:   PHQ-2 (  [employer]) 2021   Q1: Little interest or pleasure in doing things 0   Q2: Feeling down, depressed or hopeless 0   PHQ-2 Score 0   Q1: Little interest or pleasure in doing things Not at all   Q2: Feeling down, depressed or hopeless Not at all   PHQ-2 Score 0       Abuse: Current or Past (Physical, Sexual or Emotional) - No  Do you feel safe in your environment? Yes    Have you ever done Advance Care Planning? (For example, a Health Directive, POLST, or a discussion with a medical provider or your loved ones about your wishes): Yes, patient states has an Advance Care Planning document and will bring a copy to the clinic.    Social History     Tobacco Use     Smoking status: Former Smoker     Packs/day: 1.00     Years: 25.00     Pack years: 25.00     Types: Cigarettes     Start date: 1978     Quit date: 2003     Years since quittin.5     Smokeless tobacco: Never Used   Substance Use Topics     Alcohol use: Yes     Alcohol/week: 60.0 standard drinks     Comment: social/occasional     If you drink alcohol do you typically have >3 drinks per day or >7 drinks per week? No    Alcohol Use 2021   Prescreen: >3 drinks/day or >7 drinks/week? -   Prescreen: >3 drinks/day or >7 drinks/week? No       Reviewed orders with patient.  Reviewed health  maintenance and updated orders accordingly - Yes  BP Readings from Last 3 Encounters:   07/27/21 112/74   10/19/20 124/81   06/29/20 117/75    Wt Readings from Last 3 Encounters:   07/27/21 71.7 kg (158 lb)   10/19/20 73.9 kg (163 lb)   06/29/20 70.3 kg (155 lb)                    Breast Cancer Screening:    FHS-7:   Breast CA Risk Assessment (FHS-7) 7/24/2021   Did any of your first-degree relatives have breast or ovarian cancer? No   Did any of your relatives have bilateral breast cancer? No   Did any man in your family have breast cancer? No   Did any woman in your family have breast and ovarian cancer? No   Did any woman in your family have breast cancer before age 50 y? No   Do you have 2 or more relatives with breast and/or ovarian cancer? No   Do you have 2 or more relatives with breast and/or bowel cancer? No       Mammogram Screening: Recommended mammography every 1-2 years with patient discussion and risk factor consideration  Pertinent mammograms are reviewed under the imaging tab.    History of abnormal Pap smear: NO - age 30-65 PAP every 5 years with negative HPV co-testing recommended  PAP / HPV Latest Ref Rng & Units 10/21/2019 9/13/2018 9/25/2017   PAP (Historical) - NIL NIL NIL   HPV16 NEG:Negative Negative Negative Negative   HPV18 NEG:Negative Negative Negative Negative   HRHPV NEG:Negative Negative Positive(A) Positive(A)     Reviewed and updated as needed this visit by clinical staff  Tobacco  Allergies  Meds  Problems    Fam Hx  Soc Hx        Reviewed and updated as needed this visit by Provider    Meds  Problems    Fam Hx         Past Medical History:   Diagnosis Date     Anxiety disorder      Bulimia nervosa      Cancer (H) 11/2020    Skin - Basal Cell Carcinoma     Cervical high risk HPV (human papillomavirus) test positive 09/21/16, 09/25/17, 9/13/18    See problem list     Irritable bowel syndrome      Major depression      Menopause 5/2011     Other specified genital  "prolapse(618.89)       Past Surgical History:   Procedure Laterality Date     BIOPSY      Cervical, Skin     C INCISION EXTEN FOOT/TOE TENDON      toe shortened     COLONOSCOPY  10/9/13    rpt 10 yrs     LASIK  2004    bilateral     OB History    Para Term  AB Living   2 2 2 0 0 2   SAB TAB Ectopic Multiple Live Births   0 0 0 0 2      # Outcome Date GA Lbr Jonathan/2nd Weight Sex Delivery Anes PTL Lv   2 Term 95 40w0d       MOMO      Name: Glory   1 Term 93 40w0d       MOMO      Name: Coni      Obstetric Comments    x 2       Review of Systems   Constitutional: Negative for chills and fever.   HENT: Negative for congestion, ear pain, hearing loss and sore throat.    Eyes: Negative for pain and visual disturbance.   Respiratory: Negative for cough and shortness of breath.    Cardiovascular: Negative for chest pain, palpitations and peripheral edema.   Gastrointestinal: Negative for abdominal pain, constipation, diarrhea, heartburn, hematochezia and nausea.   Breasts:  Negative for tenderness, breast mass and discharge.   Genitourinary: Negative for dysuria, frequency, genital sores, hematuria, pelvic pain, urgency, vaginal bleeding and vaginal discharge.   Musculoskeletal: Negative for arthralgias, joint swelling and myalgias.   Skin: Negative for rash.   Neurological: Negative for dizziness, weakness, headaches and paresthesias.   Psychiatric/Behavioral: Negative for mood changes. The patient is not nervous/anxious.           OBJECTIVE:   /74   Pulse 61   Temp 98  F (36.7  C) (Temporal)   Resp 16   Ht 1.67 m (5' 5.75\")   Wt 71.7 kg (158 lb)   LMP 2011   SpO2 100%   BMI 25.70 kg/m    Physical Exam  GENERAL APPEARANCE: healthy, alert and no distress  EYES: Eyes grossly normal to inspection, conjunctivae and sclerae normal  HENT: ear canals and TM's normal  NECK: no adenopathy, no asymmetry, masses, or scars and thyroid normal to palpation  RESP: lungs clear to " "auscultation - no rales, rhonchi or wheezes  CV: regular rate and rhythm, normal S1 S2, no S3 or S4, no murmur, click or rub, no peripheral edema   ABDOMEN: soft, nontender, no hepatosplenomegaly, no masses   MS: no musculoskeletal defects are noted and gait is age appropriate without ataxia  SKIN: no suspicious lesions or rashes  NEURO: Normal strength and tone, sensory exam grossly normal, mentation intact and speech normal  PSYCH: mentation appears normal and affect normal/bright         ASSESSMENT/PLAN:       ICD-10-CM    1. Routine general medical examination at a health care facility  Z00.00    2. Screening for diabetes mellitus  Z13.1 Hemoglobin A1c     Glucose     Hemoglobin A1c     Glucose   3. Lipid screening  Z13.220 Lipid panel reflex to direct LDL Fasting     Lipid panel reflex to direct LDL Fasting   4. Encounter for screening mammogram for breast cancer  Z12.31 MA Screen Bilateral w/[place]   5. Insomnia, unspecified type  G47.00 traZODone (DESYREL) 50 MG tablet       Patient has been advised of split billing requirements and indicates understanding: No  COUNSELING:  Reviewed preventive health counseling, as reflected in patient instructions    Estimated body mass index is 25.7 kg/m  as calculated from the following:    Height as of this encounter: 1.67 m (5' 5.75\").    Weight as of this encounter: 71.7 kg (158 lb).  Wt Readings from Last 5 Encounters:   07/27/21 71.7 kg (158 lb)   10/19/20 73.9 kg (163 lb)   06/29/20 70.3 kg (155 lb)   05/26/20 72.6 kg (160 lb)   10/21/19 72.1 kg (159 lb)        She reports that she quit smoking about 18 years ago. Her smoking use included cigarettes. She started smoking about 43 years ago. She has a 25.00 pack-year smoking history. She has never used smokeless tobacco.      Counseling Resources:  ATP IV Guidelines  Pooled Cohorts Equation Calculator  Breast Cancer Risk Calculator  BRCA-Related Cancer Risk Assessment: FHS-7 Tool  FRAX Risk Assessment  ICSI Preventive " Guidelines  Dietary Guidelines for Americans, 2010  USDA's MyPlate  ASA Prophylaxis  Lung CA Screening    Bessy Reddy MD  [facility]

## 2021-09-27 ENCOUNTER — TRANSFERRED RECORDS (OUTPATIENT)
Dept: HEALTH INFORMATION MANAGEMENT | Facility: CLINIC | Age: 56
End: 2021-09-27

## 2021-09-28 ENCOUNTER — DOCUMENTATION ONLY (OUTPATIENT)
Dept: OTHER | Facility: CLINIC | Age: 56
End: 2021-09-28

## 2021-10-24 ENCOUNTER — HEALTH MAINTENANCE LETTER (OUTPATIENT)
Age: 56
End: 2021-10-24

## 2021-11-03 ENCOUNTER — ANCILLARY PROCEDURE (OUTPATIENT)
Dept: MAMMOGRAPHY | Facility: CLINIC | Age: 56
End: 2021-11-03
Attending: FAMILY MEDICINE
Payer: COMMERCIAL

## 2021-11-03 DIAGNOSIS — Z12.31 ENCOUNTER FOR SCREENING MAMMOGRAM FOR BREAST CANCER: ICD-10-CM

## 2021-11-03 PROCEDURE — 77067 SCR MAMMO BI INCL CAD: CPT | Mod: TC | Performed by: RADIOLOGY

## 2021-11-03 PROCEDURE — 77063 BREAST TOMOSYNTHESIS BI: CPT | Mod: TC | Performed by: RADIOLOGY

## 2021-11-04 NOTE — RESULT ENCOUNTER NOTE
Otis Delaney,  I am happy to see that you completed your mammogram and that your result is normal!  Bessy Reddy MD

## 2021-11-19 ENCOUNTER — DOCUMENTATION ONLY (OUTPATIENT)
Dept: OTHER | Facility: CLINIC | Age: 56
End: 2021-11-19
Payer: COMMERCIAL

## 2021-11-29 ASSESSMENT — ANXIETY QUESTIONNAIRES
5. BEING SO RESTLESS THAT IT IS HARD TO SIT STILL: NEARLY EVERY DAY
GAD7 TOTAL SCORE: 18
1. FEELING NERVOUS, ANXIOUS, OR ON EDGE: NEARLY EVERY DAY
7. FEELING AFRAID AS IF SOMETHING AWFUL MIGHT HAPPEN: NOT AT ALL
GAD7 TOTAL SCORE: 18
GAD7 TOTAL SCORE: 18
6. BECOMING EASILY ANNOYED OR IRRITABLE: NEARLY EVERY DAY
8. IF YOU CHECKED OFF ANY PROBLEMS, HOW DIFFICULT HAVE THESE MADE IT FOR YOU TO DO YOUR WORK, TAKE CARE OF THINGS AT HOME, OR GET ALONG WITH OTHER PEOPLE?: EXTREMELY DIFFICULT
3. WORRYING TOO MUCH ABOUT DIFFERENT THINGS: NEARLY EVERY DAY
4. TROUBLE RELAXING: NEARLY EVERY DAY
7. FEELING AFRAID AS IF SOMETHING AWFUL MIGHT HAPPEN: NOT AT ALL
2. NOT BEING ABLE TO STOP OR CONTROL WORRYING: NEARLY EVERY DAY

## 2021-11-29 NOTE — PATIENT INSTRUCTIONS
1) Start the escitalopram (Lexapro) by taking a half tablet daily for the first 6 days.  Then increase to a full tablet daily.      Potential side effects include initial symptoms such as gastrointestinal symptoms, change in sleep, or dry mouth which usually resolve after a few days to a week (once your body gets used to the medication).  Potential persistent side effects include sexual side effects such as decreased sexual drive.  Let me know if that becomes a problem for you.    It does generally take a week to start seeing improvements in your mood and 2-3 weeks to see the full effect of a particular dose.    2) Send me a [email or web address] update on how that is working for you in about 3 weeks.  If you have concerns notify me sooner or schedule a telehealth visit (a scheduled video or telephone visit).      Additional Info:  I kindly request that you check your Kartelahart prior to all appointments with me and complete any assigned questionnaires ahead of time.  (Or you can come a bit early to your appointment and complete questionnaires on one of our tablets.)  This frees up more of our designated visit time to address your health issues. If you have not already done so, I encourage you to sign up for [employer] ([email or web address]).  This will allow you to review your results, securely communicate with your provider care team, and schedule virtual visits as well.    FYI:  I do telehealth (video) visits exclusively on Wednesdays.  I still do in-person visits at [facility] ([phone]) on Mondays, Tuesdays and Thursdays.  You can schedule a video visit for many conditions.  Please follow this link:  [email or web address]

## 2021-11-30 ASSESSMENT — ANXIETY QUESTIONNAIRES: GAD7 TOTAL SCORE: 18

## 2021-12-01 ENCOUNTER — VIRTUAL VISIT (OUTPATIENT)
Dept: FAMILY MEDICINE | Facility: CLINIC | Age: 56
End: 2021-12-01
Payer: COMMERCIAL

## 2021-12-01 DIAGNOSIS — F41.1 GAD (GENERALIZED ANXIETY DISORDER): ICD-10-CM

## 2021-12-01 DIAGNOSIS — F33.0 MILD RECURRENT MAJOR DEPRESSION (H): Primary | ICD-10-CM

## 2021-12-01 PROCEDURE — 99214 OFFICE O/P EST MOD 30 MIN: CPT | Mod: GT | Performed by: FAMILY MEDICINE

## 2021-12-01 RX ORDER — ESCITALOPRAM OXALATE 10 MG/1
10 TABLET ORAL DAILY
Qty: 30 TABLET | Refills: 0 | Status: SHIPPED | OUTPATIENT
Start: 2021-12-01 | End: 2021-12-02

## 2021-12-01 ASSESSMENT — ANXIETY QUESTIONNAIRES
5. BEING SO RESTLESS THAT IT IS HARD TO SIT STILL: NEARLY EVERY DAY
GAD7 TOTAL SCORE: 18
3. WORRYING TOO MUCH ABOUT DIFFERENT THINGS: NEARLY EVERY DAY
1. FEELING NERVOUS, ANXIOUS, OR ON EDGE: NEARLY EVERY DAY
2. NOT BEING ABLE TO STOP OR CONTROL WORRYING: NEARLY EVERY DAY
7. FEELING AFRAID AS IF SOMETHING AWFUL MIGHT HAPPEN: NOT AT ALL
6. BECOMING EASILY ANNOYED OR IRRITABLE: NEARLY EVERY DAY

## 2021-12-01 ASSESSMENT — PATIENT HEALTH QUESTIONNAIRE - PHQ9: 5. POOR APPETITE OR OVEREATING: NEARLY EVERY DAY

## 2021-12-01 NOTE — PROGRESS NOTES
Elaina is a 56 year old who is being evaluated via a billable video visit.      How would you like to obtain your AVS? MyChart  If the video visit is dropped, the invitation should be resent by: Send to e-mail at: [email or web address]  Will anyone else be joining your video visit? No    Video Start Time: 10:43 AM    Assessment & Plan       ICD-10-CM    1. Mild recurrent major depression (H)  F33.0 escitalopram (LEXAPRO) 10 MG tablet   2. EDITH (generalized anxiety disorder)  F41.1 escitalopram (LEXAPRO) 10 MG tablet      Discussed role of SSRI medications and she was agreeable to trying Lexapro.  Reviewed potential for initial side effects (such as headache, GI symptoms, and dry mouth) that will likely subside after a week or so, but that therapeutic effects will likely take 1-2 weeks - so it's important to stick with medication for at least a month to adequately gauge effect.  Notify me of any significant side effects.  Discussed that treatment with SSRI medications requires a minimum commitment of 9-12 months; shorter courses are associated with rebound symptoms.  Discussed potential long-term side effects including sexual side effects.         Patient Instructions   1) Start the escitalopram (Lexapro) by taking a half tablet daily for the first 6 days.  Then increase to a full tablet daily.      Potential side effects include initial symptoms such as gastrointestinal symptoms, change in sleep, or dry mouth which usually resolve after a few days to a week (once your body gets used to the medication).  Potential persistent side effects include sexual side effects such as decreased sexual drive.  Let me know if that becomes a problem for you.    It does generally take a week to start seeing improvements in your mood and 2-3 weeks to see the full effect of a particular dose.    2) Send me a [employer] update on how that is working for you in about 3 weeks.  If you have concerns notify me sooner or schedule a  telehealth visit (a scheduled video or telephone visit).      Additional Info:  I kindly request that you check your MyChart prior to all appointments with me and complete any assigned questionnaires ahead of time.  (Or you can come a bit early to your appointment and complete questionnaires on one of our tablets.)  This frees up more of our designated visit time to address your health issues. If you have not already done so, I encourage you to sign up for Mychart ([email or web address]).  This will allow you to review your results, securely communicate with your provider care team, and schedule virtual visits as well.    FYI:  I do telehealth (video) visits exclusively on .  I still do in-person visits at [facility] ([phone]) on ,  and .  You can schedule a video visit for many conditions.  Please follow this link:  [email or web address]      No follow-ups on file.    Bessy Reddy MD  [facility]        Carlos Delaney is a 56 year old who presents for the following health issues    History of Present Illness       Mental Health Follow-up:  Patient presents to follow-up on Anxiety.    Patient's anxiety since last visit has been:  No change  The patient is having other symptoms associated with anxiety.  Any significant life events: relationship concerns, job concerns, housing concerns, grief or loss and health concerns  Patient is feeling anxious or having panic attacks.  Patient has no concerns about alcohol or drug use.     Social History  Tobacco Use    Smoking status: Former Smoker      Packs/day: 1.00      Years: 25.00      Pack years: 25      Types: Cigarettes      Start date: 1978      Quit date: 2003      Years since quittin.9    Smokeless tobacco: Never Used  Vaping Use    Vaping Use: Never used  Alcohol use: Yes    Alcohol/week: 60.0 standard  drinks    Comment: social/occasional  Drug use: No      Today's PHQ-9         PHQ-9 Total Score:         PHQ-9 Q9 Thoughts of better off dead/self-harm past 2 weeks :       Thoughts of suicide or self harm:      Self-harm Plan:        Self-harm Action:          Safety concerns for self or others:           She eats 4 or more servings of fruits and vegetables daily.She consumes 0 sweetened beverage(s) daily.She exercises with enough effort to increase her heart rate 30 to 60 minutes per day.  She exercises with enough effort to increase her heart rate 5 days per week. She is missing 6 dose(s) of medications per week.  She is not taking prescribed medications regularly due to other.     Pt states she wants to discuss medication options.   Feels agitated, short-tempered.  So many personal stressors - job changes, divorce, sold her house and moving into apartment, daughters both moving out of town.    She tried celexa in the past but was very reluctant to be on medication at that time so thinks she didn't try it for very long.  She does not recall any problems with it.  She notes both her mother and sister are on celexa.    She does have trazodone for insomnia but doesn't use it very often.  More recently she notes trouble with sleep maintenance.  If she wakes up in the night she starts worrying and can't get back to sleep (and it is too late to take a trazodone at that point).      PHQ 12/1/2021   PHQ-9 Total Score 10   Q9: Thoughts of better off dead/self-harm past 2 weeks Not at all     EDITH-7 SCORE 11/29/2021 12/1/2021   Total Score 18 (severe anxiety) -   Total Score 18 18     Last PHQ-9 12/1/2021   1.  Little interest or pleasure in doing things 1   2.  Feeling down, depressed, or hopeless 0   3.  Trouble falling or staying asleep, or sleeping too much 3   4.  Feeling tired or having little energy 2   5.  Poor appetite or overeating 1   6.  Feeling bad about yourself 1   7.  Trouble concentrating 1   8.  Moving  slowly or restless 1   Q9: Thoughts of better off dead/self-harm past 2 weeks 0   PHQ-9 Total Score 10   Difficulty at work, home, or with people Somewhat difficult     EDITH-7  12/1/2021   1. Feeling nervous, anxious, or on edge 3   2. Not being able to stop or control worrying 3   3. Worrying too much about different things 3   4. Trouble relaxing 3   5. Being so restless that it is hard to sit still 3   6. Becoming easily annoyed or irritable 3   7. Feeling afraid, as if something awful might happen 0   EDITH-7 Total Score 18        Review of Systems         Objective       Vitals:  No vitals were obtained today due to virtual visit.    Physical Exam   GENERAL: Healthy, alert and no distress  EYES: Eyes grossly normal to inspection.  No discharge or erythema, or obvious scleral/conjunctival abnormalities.  RESP: No audible wheeze, cough, or visible cyanosis.  No visible retractions or increased work of breathing.    SKIN: Visible skin clear. No significant rash, abnormal pigmentation or lesions.  NEURO: Cranial nerves grossly intact.  Mentation and speech appropriate for age.  PSYCH: Mentation appears normal, affect normal/bright, judgement and insight intact, normal speech and appearance well-groomed.        Video-Visit Details    Type of service:  Video Visit    Video End Time:10:57 AM    Originating Location (pt. Location): Home    Distant Location (provider location):  [facility]     Platform used for Video Visit: Pain Doctor

## 2021-12-02 ENCOUNTER — MYC MEDICAL ADVICE (OUTPATIENT)
Dept: FAMILY MEDICINE | Facility: CLINIC | Age: 56
End: 2021-12-02
Payer: COMMERCIAL

## 2021-12-02 DIAGNOSIS — F41.1 GAD (GENERALIZED ANXIETY DISORDER): ICD-10-CM

## 2021-12-02 DIAGNOSIS — F33.0 MILD RECURRENT MAJOR DEPRESSION (H): ICD-10-CM

## 2021-12-02 RX ORDER — ESCITALOPRAM OXALATE 10 MG/1
10 TABLET ORAL DAILY
Qty: 90 TABLET | Refills: 0 | Status: SHIPPED | OUTPATIENT
Start: 2021-12-02 | End: 2022-05-10

## 2021-12-02 ASSESSMENT — PATIENT HEALTH QUESTIONNAIRE - PHQ9: SUM OF ALL RESPONSES TO PHQ QUESTIONS 1-9: 10

## 2021-12-02 NOTE — TELEPHONE ENCOUNTER
Patient is calling and has also sent a my chart message please read message but she is asking if you could give her more than the 1 month worth or this medication she will be on vacation at the time of running out so if a 2 month trial fill right now would be okay she would appreciate it and if not can you just up the amount. Not sure what insurance will say but if you don't give her at least 40-45 pills worth while on vacation she will run out

## 2021-12-02 NOTE — TELEPHONE ENCOUNTER
Dr. Reddy-Please review and sign if agree. #45 pended.  1. Patient requesting increased dispense of Lexapro due to travel   A. Triage unable to authorize increase dispense; defer to provider    Thank you!  TALYA EcholsN, RN  [facility]

## 2021-12-21 NOTE — TELEPHONE ENCOUNTER
Dr. Reddy- review patients last message, RN responded to patient.     TALYA OakesN RN  [facility]

## 2022-01-10 RX ORDER — ESCITALOPRAM OXALATE 20 MG/1
20 TABLET ORAL DAILY
Qty: 90 TABLET | Refills: 0 | Status: SHIPPED | OUTPATIENT
Start: 2022-01-10 | End: 2022-04-04

## 2022-01-10 NOTE — TELEPHONE ENCOUNTER
Dr. Reddy- patient requesting increased dosage, pended 20mg.     TALYA OakesN RN  [facility]

## 2022-05-05 ENCOUNTER — MYC MEDICAL ADVICE (OUTPATIENT)
Dept: FAMILY MEDICINE | Facility: CLINIC | Age: 57
End: 2022-05-05
Payer: COMMERCIAL

## 2022-05-05 DIAGNOSIS — F33.0 MILD RECURRENT MAJOR DEPRESSION (H): ICD-10-CM

## 2022-05-05 DIAGNOSIS — F41.1 GAD (GENERALIZED ANXIETY DISORDER): ICD-10-CM

## 2022-05-06 NOTE — TELEPHONE ENCOUNTER
Patient reports doing well on 20 mg escitalopram. Requests 90 day refill.    Christina Blackwell RN  [facility]

## 2022-05-09 NOTE — TELEPHONE ENCOUNTER
Please send her a PHQ-9 and EDITH-7.  Return refill request to me with updated 9/7 responses.  Bessy Reddy MD

## 2022-05-09 NOTE — TELEPHONE ENCOUNTER
Routing refill request to provider for review/approval because:  --Labs out of range:  PHQ 9.  --See previous documentation in this encounter.        --Last visit:  12/1/2021 Maureen for depression and anxiety.    --Future Visit: none.      PHQ 12/1/2021 1/10/2022   PHQ-9 Total Score 10 11   Q9: Thoughts of better off dead/self-harm past 2 weeks Not at all Not at all

## 2022-05-10 RX ORDER — ESCITALOPRAM OXALATE 20 MG/1
20 TABLET ORAL DAILY
Qty: 90 TABLET | Refills: 3 | Status: SHIPPED | OUTPATIENT
Start: 2022-05-10 | End: 2023-05-05

## 2022-05-10 NOTE — TELEPHONE ENCOUNTER
Please see updated responses to PHQ9 And GAD7.  Marie Lambert RN  [facility]

## 2022-05-10 NOTE — TELEPHONE ENCOUNTER
Dr Espinoza, FY only, pt response    Sounds good!  We can also discuss when I come in for my annual physical sometime in the late summer/early fall.     Thanks for renewing the prescription!     Elaina

## 2022-05-10 NOTE — TELEPHONE ENCOUNTER
PHQ 12/1/2021 1/10/2022 5/10/2022   PHQ-9 Total Score 10 11 3   Q9: Thoughts of better off dead/self-harm past 2 weeks Not at all Not at all Not at all     EDITH-7 SCORE 12/1/2021 1/10/2022 5/10/2022   Total Score - 13 (moderate anxiety) 1 (minimal anxiety)   Total Score 18 13 1

## 2022-06-26 ENCOUNTER — MYC MEDICAL ADVICE (OUTPATIENT)
Dept: FAMILY MEDICINE | Facility: CLINIC | Age: 57
End: 2022-06-26

## 2022-06-27 NOTE — TELEPHONE ENCOUNTER
Writer responded via [employer].    TALYA EcholsN, RN  [facility]     Alert and oriented, no focal deficits, no motor or sensory deficits.

## 2022-10-09 ASSESSMENT — ENCOUNTER SYMPTOMS
NERVOUS/ANXIOUS: 1
COUGH: 1

## 2022-10-09 ASSESSMENT — ANXIETY QUESTIONNAIRES
5. BEING SO RESTLESS THAT IT IS HARD TO SIT STILL: NOT AT ALL
2. NOT BEING ABLE TO STOP OR CONTROL WORRYING: SEVERAL DAYS
7. FEELING AFRAID AS IF SOMETHING AWFUL MIGHT HAPPEN: SEVERAL DAYS
7. FEELING AFRAID AS IF SOMETHING AWFUL MIGHT HAPPEN: SEVERAL DAYS
3. WORRYING TOO MUCH ABOUT DIFFERENT THINGS: NOT AT ALL
8. IF YOU CHECKED OFF ANY PROBLEMS, HOW DIFFICULT HAVE THESE MADE IT FOR YOU TO DO YOUR WORK, TAKE CARE OF THINGS AT HOME, OR GET ALONG WITH OTHER PEOPLE?: SOMEWHAT DIFFICULT
4. TROUBLE RELAXING: NOT AT ALL
IF YOU CHECKED OFF ANY PROBLEMS ON THIS QUESTIONNAIRE, HOW DIFFICULT HAVE THESE PROBLEMS MADE IT FOR YOU TO DO YOUR WORK, TAKE CARE OF THINGS AT HOME, OR GET ALONG WITH OTHER PEOPLE: SOMEWHAT DIFFICULT
6. BECOMING EASILY ANNOYED OR IRRITABLE: NOT AT ALL
GAD7 TOTAL SCORE: 3
1. FEELING NERVOUS, ANXIOUS, OR ON EDGE: SEVERAL DAYS
GAD7 TOTAL SCORE: 3

## 2022-10-10 ENCOUNTER — ANCILLARY PROCEDURE (OUTPATIENT)
Dept: MAMMOGRAPHY | Facility: CLINIC | Age: 57
End: 2022-10-10
Attending: FAMILY MEDICINE
Payer: COMMERCIAL

## 2022-10-10 DIAGNOSIS — Z12.31 VISIT FOR SCREENING MAMMOGRAM: ICD-10-CM

## 2022-10-10 PROCEDURE — 77063 BREAST TOMOSYNTHESIS BI: CPT | Mod: TC | Performed by: RADIOLOGY

## 2022-10-10 PROCEDURE — 77067 SCR MAMMO BI INCL CAD: CPT | Mod: TC | Performed by: RADIOLOGY

## 2022-10-11 ENCOUNTER — OFFICE VISIT (OUTPATIENT)
Dept: FAMILY MEDICINE | Facility: CLINIC | Age: 57
End: 2022-10-11
Payer: COMMERCIAL

## 2022-10-11 VITALS
WEIGHT: 182 LBS | DIASTOLIC BLOOD PRESSURE: 70 MMHG | BODY MASS INDEX: 29.25 KG/M2 | OXYGEN SATURATION: 98 % | HEIGHT: 66 IN | RESPIRATION RATE: 15 BRPM | SYSTOLIC BLOOD PRESSURE: 104 MMHG | HEART RATE: 66 BPM | TEMPERATURE: 97.1 F

## 2022-10-11 DIAGNOSIS — Z23 NEED FOR VACCINATION: ICD-10-CM

## 2022-10-11 DIAGNOSIS — F50.20 BULIMIA NERVOSA: ICD-10-CM

## 2022-10-11 DIAGNOSIS — R53.83 FATIGUE, UNSPECIFIED TYPE: ICD-10-CM

## 2022-10-11 DIAGNOSIS — F33.0 MILD RECURRENT MAJOR DEPRESSION (H): ICD-10-CM

## 2022-10-11 DIAGNOSIS — Z13.220 LIPID SCREENING: ICD-10-CM

## 2022-10-11 DIAGNOSIS — Z00.00 ROUTINE GENERAL MEDICAL EXAMINATION AT A HEALTH CARE FACILITY: Primary | ICD-10-CM

## 2022-10-11 DIAGNOSIS — G47.00 INSOMNIA, UNSPECIFIED TYPE: ICD-10-CM

## 2022-10-11 DIAGNOSIS — F41.1 GAD (GENERALIZED ANXIETY DISORDER): ICD-10-CM

## 2022-10-11 DIAGNOSIS — R87.810 CERVICAL HIGH RISK HPV (HUMAN PAPILLOMAVIRUS) TEST POSITIVE: ICD-10-CM

## 2022-10-11 DIAGNOSIS — Z13.1 SCREENING FOR DIABETES MELLITUS: ICD-10-CM

## 2022-10-11 PROBLEM — C44.619 BASAL CELL CARCINOMA (BCC) OF LEFT FOREARM: Status: ACTIVE | Noted: 2020-12-01

## 2022-10-11 PROBLEM — C44.310 BCC (BASAL CELL CARCINOMA), FACE: Status: ACTIVE | Noted: 2020-12-01

## 2022-10-11 PROBLEM — C44.310 BCC (BASAL CELL CARCINOMA), FACE: Status: ACTIVE | Noted: 2022-10-11

## 2022-10-11 PROBLEM — C44.619 BASAL CELL CARCINOMA (BCC) OF LEFT FOREARM: Status: ACTIVE | Noted: 2022-10-11

## 2022-10-11 LAB
ANION GAP SERPL CALCULATED.3IONS-SCNC: 5 MMOL/L (ref 3–14)
BASOPHILS # BLD AUTO: 0 10E3/UL (ref 0–0.2)
BASOPHILS NFR BLD AUTO: 1 %
BUN SERPL-MCNC: 28 MG/DL (ref 7–30)
CALCIUM SERPL-MCNC: 9.3 MG/DL (ref 8.5–10.1)
CHLORIDE BLD-SCNC: 106 MMOL/L (ref 94–109)
CHOLEST SERPL-MCNC: 245 MG/DL
CO2 SERPL-SCNC: 28 MMOL/L (ref 20–32)
CREAT SERPL-MCNC: 0.87 MG/DL (ref 0.52–1.04)
EOSINOPHIL # BLD AUTO: 0.2 10E3/UL (ref 0–0.7)
EOSINOPHIL NFR BLD AUTO: 3 %
ERYTHROCYTE [DISTWIDTH] IN BLOOD BY AUTOMATED COUNT: 11.6 % (ref 10–15)
FASTING STATUS PATIENT QL REPORTED: YES
GFR SERPL CREATININE-BSD FRML MDRD: 76 ML/MIN/1.73M2
GLUCOSE BLD-MCNC: 122 MG/DL (ref 70–99)
HCT VFR BLD AUTO: 36.1 % (ref 35–47)
HDLC SERPL-MCNC: 53 MG/DL
HGB BLD-MCNC: 12.4 G/DL (ref 11.7–15.7)
IMM GRANULOCYTES # BLD: 0 10E3/UL
IMM GRANULOCYTES NFR BLD: 0 %
LDLC SERPL CALC-MCNC: 156 MG/DL
LYMPHOCYTES # BLD AUTO: 2.2 10E3/UL (ref 0.8–5.3)
LYMPHOCYTES NFR BLD AUTO: 36 %
MCH RBC QN AUTO: 30.2 PG (ref 26.5–33)
MCHC RBC AUTO-ENTMCNC: 34.3 G/DL (ref 31.5–36.5)
MCV RBC AUTO: 88 FL (ref 78–100)
MONOCYTES # BLD AUTO: 0.7 10E3/UL (ref 0–1.3)
MONOCYTES NFR BLD AUTO: 12 %
NEUTROPHILS # BLD AUTO: 2.9 10E3/UL (ref 1.6–8.3)
NEUTROPHILS NFR BLD AUTO: 48 %
NONHDLC SERPL-MCNC: 192 MG/DL
PLATELET # BLD AUTO: 268 10E3/UL (ref 150–450)
POTASSIUM BLD-SCNC: 3.9 MMOL/L (ref 3.4–5.3)
RBC # BLD AUTO: 4.11 10E6/UL (ref 3.8–5.2)
SODIUM SERPL-SCNC: 139 MMOL/L (ref 133–144)
TRIGL SERPL-MCNC: 179 MG/DL
TSH SERPL DL<=0.005 MIU/L-ACNC: 1.4 MU/L (ref 0.4–4)
WBC # BLD AUTO: 6 10E3/UL (ref 4–11)

## 2022-10-11 PROCEDURE — 84443 ASSAY THYROID STIM HORMONE: CPT | Performed by: FAMILY MEDICINE

## 2022-10-11 PROCEDURE — 88175 CYTOPATH C/V AUTO FLUID REDO: CPT | Performed by: FAMILY MEDICINE

## 2022-10-11 PROCEDURE — 80061 LIPID PANEL: CPT | Performed by: FAMILY MEDICINE

## 2022-10-11 PROCEDURE — 96127 BRIEF EMOTIONAL/BEHAV ASSMT: CPT | Performed by: FAMILY MEDICINE

## 2022-10-11 PROCEDURE — 99396 PREV VISIT EST AGE 40-64: CPT | Mod: 25 | Performed by: FAMILY MEDICINE

## 2022-10-11 PROCEDURE — 90471 IMMUNIZATION ADMIN: CPT | Performed by: FAMILY MEDICINE

## 2022-10-11 PROCEDURE — 99214 OFFICE O/P EST MOD 30 MIN: CPT | Mod: 25 | Performed by: FAMILY MEDICINE

## 2022-10-11 PROCEDURE — 36415 COLL VENOUS BLD VENIPUNCTURE: CPT | Performed by: FAMILY MEDICINE

## 2022-10-11 PROCEDURE — 87624 HPV HI-RISK TYP POOLED RSLT: CPT | Performed by: FAMILY MEDICINE

## 2022-10-11 PROCEDURE — 90682 RIV4 VACC RECOMBINANT DNA IM: CPT | Performed by: FAMILY MEDICINE

## 2022-10-11 PROCEDURE — 85025 COMPLETE CBC W/AUTO DIFF WBC: CPT | Performed by: FAMILY MEDICINE

## 2022-10-11 PROCEDURE — 80048 BASIC METABOLIC PNL TOTAL CA: CPT | Performed by: FAMILY MEDICINE

## 2022-10-11 RX ORDER — BUPROPION HYDROCHLORIDE 150 MG/1
150 TABLET ORAL EVERY MORNING
Qty: 30 TABLET | Refills: 1 | Status: SHIPPED | OUTPATIENT
Start: 2022-10-11 | End: 2022-11-02

## 2022-10-11 RX ORDER — TRAZODONE HYDROCHLORIDE 50 MG/1
50 TABLET, FILM COATED ORAL
Qty: 90 TABLET | Refills: 3 | Status: SHIPPED | OUTPATIENT
Start: 2022-10-11 | End: 2023-10-13

## 2022-10-11 ASSESSMENT — PATIENT HEALTH QUESTIONNAIRE - PHQ9
10. IF YOU CHECKED OFF ANY PROBLEMS, HOW DIFFICULT HAVE THESE PROBLEMS MADE IT FOR YOU TO DO YOUR WORK, TAKE CARE OF THINGS AT HOME, OR GET ALONG WITH OTHER PEOPLE: SOMEWHAT DIFFICULT
SUM OF ALL RESPONSES TO PHQ QUESTIONS 1-9: 8
SUM OF ALL RESPONSES TO PHQ QUESTIONS 1-9: 8

## 2022-10-11 ASSESSMENT — ENCOUNTER SYMPTOMS
NERVOUS/ANXIOUS: 1
COUGH: 1

## 2022-10-11 NOTE — RESULT ENCOUNTER NOTE
Otis Delaney,  Your lipid panel (cholesterol) results and your fasting blood sugar have increased in the past year.      Your CBC (blood cell counts), TSH (thyroid function test), and the rest of your basic metabolic panel results (blood salts and kidney function) are all normal.    My thoughts about this:  I worry that these results could cause more harm to your mental health.  Your mental health should be your top priority now and once you get in a better head space we can address these metabolic changes (if they persist). So for now I recommend that you not dwell on the numbers but rather work on healthy lifestyle habits (healthy eating and healthy exercising).  Bessy Reddy MD

## 2022-10-11 NOTE — PATIENT INSTRUCTIONS
"It is important to avoid combining too many serotonergic drugs as this increases your risk for serotonin syndrome.  Other common serotonergic drugs include tramadol (ULTRAM - a prescription pain reliever), cyclobenzaprine (FLEXERIL - a muscle relaxant), dextromethorphan (DM - an over the counter cough medicine), \"triptan\" migraine medicines, and some sleep aids.  Illicit drugs and amphetamines can also increase your risk.      Symptoms of serotonin syndrome include agitation/restlessness, confusion/disorientation, rapid pulse, flushing, sweats, dry mouth, tremor, vomiting or diarrhea.  You should notify a medical professional immediately if there is ever any concern that you are developing this array of symptoms.     If you have MyChart:  1) I kindly request that you check your MyChart prior to all appointments with me and complete any assigned questionnaires ahead of time.    2) You may receive auto-released results from our system before I have the opportunity to review and comment.  Be assured I will review and comment on all of your results as soon as I can.      If you do not have MyChart:  1) I encourage you to sign up for Mychart ([email or web address]).  This will allow you to review your results, securely communicate with your care team, and schedule virtual visits as well.  2) Please be aware that result letters from the clinic can take up to 2 weeks but urgent results will be called to you.      FYI:  1) I do virtual (video) visits exclusively on Wednesdays.  I still do in-person visits at [facility] ([phone]) on Mondays, Tuesdays and Thursdays.  You can schedule a video visit for many conditions.  Please follow this link:  [email or web address]  2) My schedule has been booking out very far in advance (2 months).  I apologize for the lack of timely access.  If you need to be seen for a chronic condition or preventive (wellness) " visit, please be sure to schedule that appointment 2-3 months in advance.  If you have a concern that you feel cannot wait until my next available appointment (such as a hospital follow-up or new symptom of concern) please ask to speak to one of the [place] nurses who may be able to access a sooner appointment.    I do ask that all patients who are taking chronic medications for conditions that I am managing schedule an in-person visit with me at least once a year.     To schedule any ordered imaging studies (including mammogram and/or DEXA scan) you can call [facility] at [phone].       Preventive Health Recommendations  Female Ages 50 - 64    Yearly exam: See your health care provider every year in order to  Review health changes.   Discuss preventive care.    Review your medicines if your doctor has prescribed any.    Get a Pap test every three years (unless you have an abnormal result and your provider advises testing more often).  If you get Pap tests with HPV test, you only need to test every 5 years, unless you have an abnormal result.   You do not need a Pap test if your uterus was removed (hysterectomy) and you have not had cancer.  You should be tested each year for STDs (sexually transmitted diseases) if you're at risk.   Have a mammogram every 1 to 2 years.  Have a colonoscopy at age 50, or have a yearly FIT test (stool test). These exams screen for colon cancer.    Have a cholesterol test every 5 years, or more often if advised.  Have a diabetes test (fasting glucose) every three years. If you are at risk for diabetes, you should have this test more often.   If you are at risk for osteoporosis (brittle bone disease), think about having a bone density scan (DEXA).    Shots: Get a flu shot each year. Get a tetanus shot every 10 years.    Nutrition:   Eat at least 5 servings of fruits and vegetables each day.  Eat whole-grain bread, whole-wheat pasta and brown rice instead of  white grains and rice.  Get adequate Calcium and Vitamin D.     Lifestyle  Exercise at least 150 minutes a week (30 minutes a day, 5 days a week). This will help you control your weight and prevent disease.  Limit alcohol to one drink per day.  No smoking.   Wear sunscreen to prevent skin cancer.   See your dentist every six months for an exam and cleaning.  See your eye doctor every 1 to 2 years.

## 2022-10-11 NOTE — PROGRESS NOTES
SUBJECTIVE:   CC: Elaina is an 57 year old who presents for preventive health visit.       Patient has been advised of split billing requirements and indicates understanding: Yes     Healthy Habits:     Getting at least 3 servings of Calcium per day:  Yes    Bi-annual eye exam:  Yes    Dental care twice a year:  Yes    Sleep apnea or symptoms of sleep apnea:  None    Diet:  Regular (no restrictions)    Frequency of exercise:  1 day/week    Duration of exercise:  Less than 15 minutes    Taking medications regularly:  Yes    Medication side effects:  None    PHQ-2 Total Score: 0    Additional concerns today:  Yes      Depression and Anxiety Follow-Up    How are you doing with your depression since your last visit? Worsened - has struggled with a recurrent bout of bulimia.  She feels the Lexapro increased her hunger which may have contributed.  She also deals with longstanding issue from parental alcoholism.  She has reached out to [facility] and is on waiting list to establish with a therapist.  She was told it would she would likely be assigned therapist in November.      How are you doing with your anxiety since your last visit?  No change    Are you having other symptoms that might be associated with depression or anxiety? Yes:  binging/purging and fatigue    Have you had a significant life event? No But does live alone now.  Is  and both of her daughters have moved out.     Insomnia - uses trazodone PRN infrequently and finds it effective.        Social History     Tobacco Use     Smoking status: Former     Packs/day: 1.00     Years: 25.00     Pack years: 25.00     Types: Cigarettes     Start date: 1978     Quit date: 2003     Years since quittin.7     Smokeless tobacco: Never   Vaping Use     Vaping Use: Never used   Substance Use Topics     Alcohol use: Yes     Alcohol/week: 5.0 standard drinks     Types: 5 Glasses of wine per week     Comment: social/occasional     Drug use: No      PHQ 1/10/2022 5/10/2022 10/11/2022   PHQ-9 Total Score 11 3 8   Q9: Thoughts of better off dead/self-harm past 2 weeks Not at all Not at all Not at all     EDITH-7 SCORE 1/10/2022 5/10/2022 10/9/2022   Total Score 13 (moderate anxiety) 1 (minimal anxiety) 3 (minimal anxiety)   Total Score 13 1 3     Last PHQ-9 10/11/2022   1.  Little interest or pleasure in doing things 0   2.  Feeling down, depressed, or hopeless 0   3.  Trouble falling or staying asleep, or sleeping too much 0   4.  Feeling tired or having little energy 2   5.  Poor appetite or overeating 3   6.  Feeling bad about yourself 0   7.  Trouble concentrating 3   8.  Moving slowly or restless 0   Q9: Thoughts of better off dead/self-harm past 2 weeks 0   PHQ-9 Total Score 8   Difficulty at work, home, or with people -     EDITH-7  10/9/2022   1. Feeling nervous, anxious, or on edge 1   2. Not being able to stop or control worrying 1   3. Worrying too much about different things 0   4. Trouble relaxing 0   5. Being so restless that it is hard to sit still 0   6. Becoming easily annoyed or irritable 0   7. Feeling afraid, as if something awful might happen 1   EDITH-7 Total Score 3   If you checked any problems, how difficult have they made it for you to do your work, take care of things at home, or get along with other people? Somewhat difficult       Suicide Assessment Five-step Evaluation and Treatment (SAFE-T)      Today's PHQ-2 Score:   PHQ-2 ( 1999 Pfizer) 10/9/2022   Q1: Little interest or pleasure in doing things 0   Q2: Feeling down, depressed or hopeless 0   PHQ-2 Score 0   PHQ-2 Total Score (12-17 Years)- Positive if 3 or more points; Administer PHQ-A if positive -   Q1: Little interest or pleasure in doing things Not at all   Q2: Feeling down, depressed or hopeless Not at all   PHQ-2 Score 0       Abuse: Current or Past (Physical, Sexual or Emotional) - No  Do you feel safe in your environment? Yes        Social History     Tobacco Use      Smoking status: Former     Packs/day: 1.00     Years: 25.00     Pack years: 25.00     Types: Cigarettes     Start date: 1978     Quit date: 2003     Years since quittin.7     Smokeless tobacco: Never   Substance Use Topics     Alcohol use: Yes     Alcohol/week: 5.0 standard drinks     Types: 5 Glasses of wine per week     Comment: social/occasional         Alcohol Use 10/9/2022   Prescreen: >3 drinks/day or >7 drinks/week? Yes   Prescreen: >3 drinks/day or >7 drinks/week? -   AUDIT SCORE  5       Reviewed orders with patient.  Reviewed health maintenance and updated orders accordingly - Yes  BP Readings from Last 3 Encounters:   10/11/22 104/70   21 112/74   10/19/20 124/81    Wt Readings from Last 3 Encounters:   10/11/22 82.6 kg (182 lb)   21 71.7 kg (158 lb)   10/19/20 73.9 kg (163 lb)              Breast Cancer Screening:  FHS-7:   Breast CA Risk Assessment (FHS-7) 2021 11/3/2021 11/3/2021 10/9/2022 10/10/2022   Did any of your first-degree relatives have breast or ovarian cancer? No No No Yes No   Did any of your relatives have bilateral breast cancer? No No No No No   Did any man in your family have breast cancer? No No No No No   Did any woman in your family have breast and ovarian cancer? No No No No No   Did any woman in your family have breast cancer before age 50 y? No No No No No   Do you have 2 or more relatives with breast and/or ovarian cancer? No No No No No   Do you have 2 or more relatives with breast and/or bowel cancer? No No No No Yes   Mammogram Screening: Recommended mammography every 1-2 years with patient discussion and risk factor consideration  Pertinent mammograms are reviewed under the imaging tab.      History of abnormal Pap smear: YES - as noted in problem list  PAP / HPV Latest Ref Rng & Units 10/21/2019 2018 2017   PAP (Historical) - NIL NIL NIL   HPV16 NEG:Negative Negative Negative Negative   HPV18 NEG:Negative Negative Negative Negative  "  HRHPV NEG:Negative Negative Positive(A) Positive(A)     Reviewed and updated as needed this visit by clinical staff   Tobacco  Allergies  Meds  Problems  Med Hx  Surg Hx  Fam Hx  Soc   Hx        Reviewed and updated as needed this visit by Provider     Meds  Problems            Past Medical History:   Diagnosis Date     Anxiety disorder      Bulimia nervosa      Cancer (H) 2020    Skin - Basal Cell Carcinoma     Cervical high risk HPV (human papillomavirus) test positive 16, 17, 18    See problem list     Irritable bowel syndrome      Major depression      Menopause 2011     Other specified genital prolapse(618.89)       Past Surgical History:   Procedure Laterality Date     BIOPSY      Cervical, Skin     COLONOSCOPY  10/9/13    rpt 10 yrs     LASIK      bilateral     ZZC INCISION EXTEN FOOT/TOE TENDON      toe shortened     OB History    Para Term  AB Living   2 2 2 0 0 2   SAB IAB Ectopic Multiple Live Births   0 0 0 0 2      # Outcome Date GA Lbr Jonathan/2nd Weight Sex Delivery Anes PTL Lv   2 Term 95 40w0d       MOMO      Name: Glory   1 Term 93 40w0d       MOMO      Name: Coni      Obstetric Comments    x 2       Review of Systems   Respiratory: Positive for cough.    Psychiatric/Behavioral: The patient is nervous/anxious.      Remainder of 10 point ROS of systems including Constitutional, ENT, Respiratory, Cardiovascular, Gastroenterology, Genitourinary, Integumentary, Musculoskeletal, Neurologic were all negative except for pertinent positives noted.      OBJECTIVE:   /70 (BP Location: Right arm, Patient Position: Sitting, Cuff Size: Adult Large)   Pulse 66   Temp 97.1  F (36.2  C) (Temporal)   Resp 15   Ht 1.676 m (5' 6\")   Wt 82.6 kg (182 lb)   LMP 2011   SpO2 98%   BMI 29.38 kg/m    Physical Exam  GENERAL APPEARANCE: healthy, alert and no distress  EYES: Eyes grossly normal to inspection, conjunctivae and " sclerae normal  HENT: ear canals and TM's normal  NECK: no adenopathy, no asymmetry, masses, or scars and thyroid normal to palpation  RESP: lungs clear to auscultation - no rales, rhonchi or wheezes  CV: regular rate and rhythm, normal S1 S2, no S3 or S4, no murmur, click or rub, no peripheral edema   ABDOMEN: soft, nontender, no hepatosplenomegaly, no masses   :  vulva, vagina, and cervix are normal in appearance and pap smear was obtained   MS: no musculoskeletal defects are noted and gait is age appropriate without ataxia  SKIN: no suspicious lesions or rashes  NEURO: Normal strength and tone, sensory exam grossly normal, mentation intact and speech normal  PSYCH: mentation appears normal and affect normal/bright       ASSESSMENT/PLAN:     Routine general medical examination at a health care facility  Reviewed/updated HM     Cervical high risk HPV (human papillomavirus) test positive  - Pap diagnostic with HPV    Lipid screening  - Lipid panel reflex to direct LDL Fasting    Screening for diabetes mellitus  - Basic metabolic panel  (Ca, Cl, CO2, Creat, Gluc, K, Na, BUN)    Mild recurrent major depression (H)  With fatigue and increased appetite.  We'll have her add Wellbutrin  mg daily to the Lexapro 20 mg daily dose.  Discussed that this has potential to increase anxiety or worsen insomnia and to let me know if that's the case.  If it is effective we could try decreasing the Lexapro dose.  - buPROPion (WELLBUTRIN XL) 150 MG 24 hr tablet  Dispense: 30 tablet; Refill: 1    EDITH (generalized anxiety disorder)  Adequately controlled and we'll monitor with the addition of Wellbutrin.      Insomnia, unspecified type  PRN use of trazodone which she finds helpful.  Discussed that she will now be on 3 serotonergic medications and I gave her information on signs/symptoms of serotonergic syndrome and other common serotonergic medications to avoid.    - traZODone (DESYREL) 50 MG tablet  Dispense: 90 tablet; Refill:  "3    Bulimia nervosa  She has resumed exercise but does not feel she is over-exercising.  She is on list to establish with new eating disorder therapist at [facility].    Fatigue, unspecified type  We'll start work-up.    - CBC with platelets and differential  - TSH with free T4 reflex    Need for vaccination  She plans to get her COVID Bivalent booster another day.    - INFLUENZA QUAD, RECOMBINANT, P-FREE (RIV4) (FLUBLOK) AGE 50-64 [LYS973]       COUNSELING:  Reviewed preventive health counseling, as reflected in patient instructions    Estimated body mass index is 29.38 kg/m  as calculated from the following:    Height as of this encounter: 1.676 m (5' 6\").    Weight as of this encounter: 82.6 kg (182 lb).  Wt Readings from Last 3 Encounters:   10/11/22 82.6 kg (182 lb)   07/27/21 71.7 kg (158 lb)   10/19/20 73.9 kg (163 lb)    Weight management plan: eating disorder theapy at [facility]    She reports that she quit smoking about 19 years ago. Her smoking use included cigarettes. She started smoking about 44 years ago. She has a 25.00 pack-year smoking history. She has never used smokeless tobacco.      Counseling Resources:  ATP IV Guidelines  Pooled Cohorts Equation Calculator  Breast Cancer Risk Calculator  BRCA-Related Cancer Risk Assessment: FHS-7 Tool  FRAX Risk Assessment  ICSI Preventive Guidelines  Dietary Guidelines for Americans, 2010  USDA's MyPlate  ASA Prophylaxis  Lung CA Screening    Bessy Reddy MD  [facility]        "

## 2022-10-13 LAB
BKR LAB AP GYN ADEQUACY: NORMAL
BKR LAB AP GYN INTERPRETATION: NORMAL
BKR LAB AP HPV REFLEX: NORMAL
BKR LAB AP PREVIOUS ABNL DX: NORMAL
BKR LAB AP PREVIOUS ABNORMAL: NORMAL
PATH REPORT.COMMENTS IMP SPEC: NORMAL
PATH REPORT.COMMENTS IMP SPEC: NORMAL
PATH REPORT.RELEVANT HX SPEC: NORMAL

## 2022-10-17 LAB
HUMAN PAPILLOMA VIRUS 16 DNA: NEGATIVE
HUMAN PAPILLOMA VIRUS 18 DNA: NEGATIVE
HUMAN PAPILLOMA VIRUS FINAL DIAGNOSIS: NORMAL
HUMAN PAPILLOMA VIRUS OTHER HR: NEGATIVE

## 2022-11-01 ENCOUNTER — MYC MEDICAL ADVICE (OUTPATIENT)
Dept: FAMILY MEDICINE | Facility: CLINIC | Age: 57
End: 2022-11-01

## 2022-11-01 DIAGNOSIS — F33.0 MILD RECURRENT MAJOR DEPRESSION (H): ICD-10-CM

## 2022-11-01 DIAGNOSIS — F41.1 GAD (GENERALIZED ANXIETY DISORDER): ICD-10-CM

## 2022-11-02 NOTE — TELEPHONE ENCOUNTER
Dr Reddy,    Rxs queued up if you would like to order. Should we take trazodone of med list? Pt writes:    Hi Dr. Reddy,  I've been taking the Bupropon HCL for a few weeks now and seem to be tolerating it pretty well.  Have experienced some difficulty sleeping but it's not much different then the nights I choose not to take my Trazadone.   First couple of days I felt a little jittery and had mile headache but those have since disappeared.     So, if you're good with it...can you call/send in a new [facility] the [facility]?       Britney Mayfield, RN, BSN  [facility]

## 2022-11-03 NOTE — TELEPHONE ENCOUNTER
Sent phq9 and gad7. Unable to send in this encounter, so had to create a new one    TALYA OakesN RN  [facility]

## 2022-11-04 RX ORDER — BUPROPION HYDROCHLORIDE 150 MG/1
150 TABLET ORAL EVERY MORNING
Qty: 90 TABLET | Refills: 3 | Status: SHIPPED | OUTPATIENT
Start: 2022-11-04 | End: 2023-05-10

## 2022-11-04 NOTE — TELEPHONE ENCOUNTER
PHQ 5/10/2022 10/11/2022 11/4/2022   PHQ-9 Total Score 3 8 3   Q9: Thoughts of better off dead/self-harm past 2 weeks Not at all Not at all Not at all     EDITH-7 SCORE 5/10/2022 10/9/2022 11/4/2022   Total Score 1 (minimal anxiety) 3 (minimal anxiety) 2 (minimal anxiety)   Total Score 1 3 2

## 2022-11-04 NOTE — TELEPHONE ENCOUNTER
Dr. Reddy- pt filled out phq9 and gad7    PHQ-9 score:    PHQ 11/4/2022   PHQ-9 Total Score 3   Q9: Thoughts of better off dead/self-harm past 2 weeks Not at all     GAD7 score: 2    TALYA OakesN RN  [facility]

## 2023-04-26 ENCOUNTER — TRANSFERRED RECORDS (OUTPATIENT)
Dept: HEALTH INFORMATION MANAGEMENT | Facility: CLINIC | Age: 58
End: 2023-04-26
Payer: COMMERCIAL

## 2023-05-04 DIAGNOSIS — F41.1 GAD (GENERALIZED ANXIETY DISORDER): ICD-10-CM

## 2023-05-04 DIAGNOSIS — F33.0 MILD RECURRENT MAJOR DEPRESSION (H): ICD-10-CM

## 2023-05-05 ENCOUNTER — MYC MEDICAL ADVICE (OUTPATIENT)
Dept: FAMILY MEDICINE | Facility: CLINIC | Age: 58
End: 2023-05-05
Payer: COMMERCIAL

## 2023-05-05 RX ORDER — ESCITALOPRAM OXALATE 20 MG/1
20 TABLET ORAL DAILY
Qty: 90 TABLET | Refills: 0 | Status: SHIPPED | OUTPATIENT
Start: 2023-05-05 | End: 2023-05-10

## 2023-05-05 NOTE — TELEPHONE ENCOUNTER
Routing refill request to provider for review/approval because:  -- Kae given x1   -- Patient needs to be seen because:  Follow-up visit  -- PHQ9 due. Questionnaire sent to patient via [employer]  -- [employer]. Message sent to schedule visit    Last Written Prescription Date:  5/10/2022  Last Fill Quantity: 90,  # refills: 3   Last office visit: Visit date not found ; last virtual visit: Visit date not found with prescribing provider:  Dr. Reddy   Future Office Visit:  None    Sangeetha Hobson, TALYAN RN  [facility]

## 2023-05-05 NOTE — TELEPHONE ENCOUNTER
We just did an annual preventive visit in October (not even 6 months ago) so I don't need to see her in person.    I would like to check in with her virtually.  Please recommend she schedule a virtual visit (a scheduled video or telephone visit) with me to follow-up on depression/anxiety.        5/10/2022    10:07 AM 10/11/2022     7:45 AM 11/4/2022    10:43 AM   PHQ   PHQ-9 Total Score 3 8 3   Q9: Thoughts of better off dead/self-harm past 2 weeks Not at all Not at all Not at all            5/10/2022    10:08 AM 10/9/2022     9:32 PM 11/4/2022    10:44 AM   EDITH-7 SCORE   Total Score 1 (minimal anxiety) 3 (minimal anxiety) 2 (minimal anxiety)   Total Score 1 3 2

## 2023-05-08 ASSESSMENT — PATIENT HEALTH QUESTIONNAIRE - PHQ9
SUM OF ALL RESPONSES TO PHQ QUESTIONS 1-9: 4
SUM OF ALL RESPONSES TO PHQ QUESTIONS 1-9: 4
10. IF YOU CHECKED OFF ANY PROBLEMS, HOW DIFFICULT HAVE THESE PROBLEMS MADE IT FOR YOU TO DO YOUR WORK, TAKE CARE OF THINGS AT HOME, OR GET ALONG WITH OTHER PEOPLE: SOMEWHAT DIFFICULT

## 2023-05-10 ENCOUNTER — VIRTUAL VISIT (OUTPATIENT)
Dept: FAMILY MEDICINE | Facility: CLINIC | Age: 58
End: 2023-05-10
Payer: COMMERCIAL

## 2023-05-10 DIAGNOSIS — Z12.11 SCREEN FOR COLON CANCER: ICD-10-CM

## 2023-05-10 DIAGNOSIS — F41.1 GAD (GENERALIZED ANXIETY DISORDER): ICD-10-CM

## 2023-05-10 DIAGNOSIS — F33.42 RECURRENT MAJOR DEPRESSION IN FULL REMISSION (H): Primary | ICD-10-CM

## 2023-05-10 DIAGNOSIS — K21.9 GASTROESOPHAGEAL REFLUX DISEASE, UNSPECIFIED WHETHER ESOPHAGITIS PRESENT: ICD-10-CM

## 2023-05-10 PROCEDURE — 96127 BRIEF EMOTIONAL/BEHAV ASSMT: CPT | Performed by: FAMILY MEDICINE

## 2023-05-10 PROCEDURE — 99214 OFFICE O/P EST MOD 30 MIN: CPT | Performed by: FAMILY MEDICINE

## 2023-05-10 RX ORDER — BUPROPION HYDROCHLORIDE 150 MG/1
150 TABLET ORAL EVERY MORNING
Qty: 90 TABLET | Refills: 3 | Status: SHIPPED | OUTPATIENT
Start: 2023-05-10 | End: 2024-05-14

## 2023-05-10 RX ORDER — FAMOTIDINE 20 MG/1
20 TABLET, FILM COATED ORAL 2 TIMES DAILY
Qty: 180 TABLET | Refills: 3 | Status: SHIPPED | OUTPATIENT
Start: 2023-05-10 | End: 2023-11-27

## 2023-05-10 RX ORDER — ESCITALOPRAM OXALATE 20 MG/1
20 TABLET ORAL DAILY
Qty: 90 TABLET | Refills: 3 | Status: SHIPPED | OUTPATIENT
Start: 2023-05-10 | End: 2023-11-27 | Stop reason: DRUGHIGH

## 2023-05-10 ASSESSMENT — ANXIETY QUESTIONNAIRES
7. FEELING AFRAID AS IF SOMETHING AWFUL MIGHT HAPPEN: NOT AT ALL
IF YOU CHECKED OFF ANY PROBLEMS ON THIS QUESTIONNAIRE, HOW DIFFICULT HAVE THESE PROBLEMS MADE IT FOR YOU TO DO YOUR WORK, TAKE CARE OF THINGS AT HOME, OR GET ALONG WITH OTHER PEOPLE: SOMEWHAT DIFFICULT
7. FEELING AFRAID AS IF SOMETHING AWFUL MIGHT HAPPEN: NOT AT ALL
2. NOT BEING ABLE TO STOP OR CONTROL WORRYING: NOT AT ALL
8. IF YOU CHECKED OFF ANY PROBLEMS, HOW DIFFICULT HAVE THESE MADE IT FOR YOU TO DO YOUR WORK, TAKE CARE OF THINGS AT HOME, OR GET ALONG WITH OTHER PEOPLE?: SOMEWHAT DIFFICULT
5. BEING SO RESTLESS THAT IT IS HARD TO SIT STILL: NOT AT ALL
GAD7 TOTAL SCORE: 3
GAD7 TOTAL SCORE: 3
3. WORRYING TOO MUCH ABOUT DIFFERENT THINGS: SEVERAL DAYS
6. BECOMING EASILY ANNOYED OR IRRITABLE: NOT AT ALL
4. TROUBLE RELAXING: SEVERAL DAYS
1. FEELING NERVOUS, ANXIOUS, OR ON EDGE: SEVERAL DAYS

## 2023-05-10 NOTE — PATIENT INSTRUCTIONS
"To schedule your colonoscopy at [facility] you can call [facility] at [phone].    For GERD (Heartburn):  Avoid trigger foods (coffee, tea, alcohol, chocolate, mint, spicy foods, and acidic foods such as tomato-based sauces).  Try to finish eating at least 3 hours prior to bedtime.  Elevate the head of your bed by 4\" or so.  You can use bed risers, bricks, wooden blocks.    "

## 2023-05-10 NOTE — PROGRESS NOTES
"Elaina is a 58 year old who is being evaluated via a billable telephone visit.      What phone number would you like to be contacted at? [phone]  How would you like to obtain your AVS? Martina    Distant Location (provider location):  Off-site    Assessment & Plan     Recurrent major depression in full remission (H)  EDITH (generalized anxiety disorder)  - escitalopram (LEXAPRO) 20 MG tablet  Dispense: 90 tablet; Refill: 3  - buPROPion (WELLBUTRIN XL) 150 MG 24 hr tablet  Dispense: 90 tablet; Refill: 3    Gastroesophageal reflux disease, unspecified whether esophagitis present  I recommended she use the H2 blocker more liberally and reserve PPI for more significant exacerbations.  Discussed lifestyle changes to help with GERD symptoms.    - famotidine (PEPCID) 20 MG tablet  Dispense: 180 tablet; Refill: 3    Screen for colon cancer  - Colonoscopy Screening  Referral     Patient Instructions   To schedule your colonoscopy at [facility] you can call [facility] at [phone].    For GERD (Heartburn):    Avoid trigger foods (coffee, tea, alcohol, chocolate, mint, spicy foods, and acidic foods such as tomato-based sauces).    Try to finish eating at least 3 hours prior to bedtime.    Elevate the head of your bed by 4\" or so.  You can use bed risers, bricks, wooden blocks.        Bessy Reddy MD  [facility]        Carlos Delaney is a 58 year old, presenting for the following health issues:  Recheck Medication        5/10/2023     8:18 AM   Additional Questions   Roomed by ANDREW TORRES     History of Present Illness       Mental Health Follow-up:  Patient presents to follow-up on Anxiety.    Patient's anxiety since last visit has been:  Good  The patient is not having other symptoms associated with anxiety.  Any significant life events: No  Patient is feeling anxious or having panic attacks.  Patient has no concerns about alcohol or drug use.She consumes 0 sweetened " beverage(s) daily.She exercises with enough effort to increase her heart rate 9 or less minutes per day.  She exercises with enough effort to increase her heart rate 3 or less days per week.   She is taking medications regularly.  Today's EDITH-7 Score: 3       Depression and Anxiety Follow-Up  When I last spoke to Elaina in the fall she was struggling more with her Bulimia symptoms.    She is now seeing a therapist every other week to work on her ED symptoms and is doing better.  Purging is much better.  Binging is still an issue.  Mood is improved - feels less agitated and anxious  Sleeping better - rarely uses trazodone  She notes work stress at [employer] and some personal stressors involving planning many family events this summer.  She feels her current medication regimen is working well for her and desires no change.  She has gained weight and has noticed more GERD symptoms.  She uses famotidine prn which is helpful but she'd like a prescription for omeprazole.    No dysphagia, odynophagia or voice change.  She does have a FHX of GERD and Laguna's.    Social History     Tobacco Use     Smoking status: Former     Packs/day: 1.00     Years: 25.00     Pack years: 25.00     Types: Cigarettes     Start date: 1978     Quit date: 2003     Years since quittin.3     Smokeless tobacco: Never   Vaping Use     Vaping status: Never Used   Substance Use Topics     Alcohol use: Yes     Alcohol/week: 5.0 standard drinks of alcohol     Types: 5 Glasses of wine per week     Comment: social/occasional     Drug use: No         10/11/2022     7:45 AM 2022    10:43 AM 2023    11:01 AM   PHQ   PHQ-9 Total Score 8 3 4   Q9: Thoughts of better off dead/self-harm past 2 weeks Not at all Not at all Not at all         10/9/2022     9:32 PM 2022    10:44 AM 5/10/2023     8:14 AM   EDITH-7 SCORE   Total Score 3 (minimal anxiety) 2 (minimal anxiety) 3 (minimal anxiety)   Total Score 3 2 3       Review of  Systems   as above       Objective           Vitals:  No vitals were obtained today due to virtual visit.    Physical Exam   GEN:  no apparent distress  PSYCH: Alert and engaged; coherent speech with normal rate and volume; able to articulate logical thoughts, no tangential thoughts, no apparent hallucinations or delusions; affect is normal  RESP: No cough, no audible wheezing, able to talk in full sentences  Remainder of exam unable to be completed due to telephone visits         Phone call duration: 16 minutes  8:37 AM   8:54 AM

## 2023-05-14 ENCOUNTER — MYC MEDICAL ADVICE (OUTPATIENT)
Dept: FAMILY MEDICINE | Facility: CLINIC | Age: 58
End: 2023-05-14
Payer: COMMERCIAL

## 2023-05-16 NOTE — TELEPHONE ENCOUNTER
See RN response to patient's mychart message re:famotidine rx    TALYA HensonN RN  [facility]

## 2023-07-28 ENCOUNTER — TRANSFERRED RECORDS (OUTPATIENT)
Dept: HEALTH INFORMATION MANAGEMENT | Facility: CLINIC | Age: 58
End: 2023-07-28
Payer: COMMERCIAL

## 2023-09-11 ENCOUNTER — MYC MEDICAL ADVICE (OUTPATIENT)
Dept: FAMILY MEDICINE | Facility: CLINIC | Age: 58
End: 2023-09-11
Payer: COMMERCIAL

## 2023-09-20 ENCOUNTER — PATIENT OUTREACH (OUTPATIENT)
Dept: CARE COORDINATION | Facility: CLINIC | Age: 58
End: 2023-09-20
Payer: COMMERCIAL

## 2023-10-02 ENCOUNTER — TRANSFERRED RECORDS (OUTPATIENT)
Dept: HEALTH INFORMATION MANAGEMENT | Facility: CLINIC | Age: 58
End: 2023-10-02
Payer: COMMERCIAL

## 2023-10-04 ENCOUNTER — ANCILLARY PROCEDURE (OUTPATIENT)
Dept: MAMMOGRAPHY | Facility: CLINIC | Age: 58
End: 2023-10-04
Attending: FAMILY MEDICINE
Payer: COMMERCIAL

## 2023-10-04 DIAGNOSIS — Z12.31 VISIT FOR SCREENING MAMMOGRAM: ICD-10-CM

## 2023-10-04 PROCEDURE — 77067 SCR MAMMO BI INCL CAD: CPT | Mod: TC | Performed by: RADIOLOGY

## 2023-10-04 PROCEDURE — 77063 BREAST TOMOSYNTHESIS BI: CPT | Mod: TC | Performed by: RADIOLOGY

## 2023-10-06 ENCOUNTER — TRANSFERRED RECORDS (OUTPATIENT)
Dept: HEALTH INFORMATION MANAGEMENT | Facility: CLINIC | Age: 58
End: 2023-10-06
Payer: COMMERCIAL

## 2023-10-13 ENCOUNTER — TRANSFERRED RECORDS (OUTPATIENT)
Dept: HEALTH INFORMATION MANAGEMENT | Facility: CLINIC | Age: 58
End: 2023-10-13
Payer: COMMERCIAL

## 2023-10-13 DIAGNOSIS — G47.00 INSOMNIA, UNSPECIFIED TYPE: ICD-10-CM

## 2023-10-13 RX ORDER — TRAZODONE HYDROCHLORIDE 50 MG/1
50 TABLET, FILM COATED ORAL
Qty: 90 TABLET | Refills: 3 | Status: SHIPPED | OUTPATIENT
Start: 2023-10-13

## 2023-11-01 ENCOUNTER — MYC MEDICAL ADVICE (OUTPATIENT)
Dept: FAMILY MEDICINE | Facility: CLINIC | Age: 58
End: 2023-11-01
Payer: COMMERCIAL

## 2023-11-01 DIAGNOSIS — Z13.1 SCREENING FOR DIABETES MELLITUS: ICD-10-CM

## 2023-11-01 DIAGNOSIS — Z13.220 LIPID SCREENING: Primary | ICD-10-CM

## 2023-11-01 DIAGNOSIS — Z13.29 SCREENING FOR THYROID DISORDER: ICD-10-CM

## 2023-11-03 NOTE — TELEPHONE ENCOUNTER
Dr. Reddy--    Due to concerns for insurance coverage, would you be ok with her getting labs done before appt?    Pended same labs that were done last year (left out CBC since it seemed to be for fatigue last year)    TALYA LopezN ANDREW  [facility]

## 2023-11-10 ENCOUNTER — LAB (OUTPATIENT)
Dept: LAB | Facility: CLINIC | Age: 58
End: 2023-11-10
Payer: COMMERCIAL

## 2023-11-10 DIAGNOSIS — Z13.1 SCREENING FOR DIABETES MELLITUS: ICD-10-CM

## 2023-11-10 DIAGNOSIS — Z13.220 LIPID SCREENING: ICD-10-CM

## 2023-11-10 PROCEDURE — 80061 LIPID PANEL: CPT

## 2023-11-10 PROCEDURE — 80048 BASIC METABOLIC PNL TOTAL CA: CPT

## 2023-11-10 PROCEDURE — 36415 COLL VENOUS BLD VENIPUNCTURE: CPT

## 2023-11-11 LAB
ANION GAP SERPL CALCULATED.3IONS-SCNC: 13 MMOL/L (ref 7–15)
BUN SERPL-MCNC: 13.8 MG/DL (ref 8–23)
CALCIUM SERPL-MCNC: 10 MG/DL (ref 8.8–10.2)
CHLORIDE SERPL-SCNC: 103 MMOL/L (ref 98–107)
CHOLEST SERPL-MCNC: 255 MG/DL
CREAT SERPL-MCNC: 0.88 MG/DL (ref 0.51–0.95)
DEPRECATED HCO3 PLAS-SCNC: 24 MMOL/L (ref 22–29)
EGFRCR SERPLBLD CKD-EPI 2021: 75 ML/MIN/1.73M2
GLUCOSE SERPL-MCNC: 104 MG/DL (ref 70–99)
HDLC SERPL-MCNC: 51 MG/DL
LDLC SERPL CALC-MCNC: 164 MG/DL
NONHDLC SERPL-MCNC: 204 MG/DL
POTASSIUM SERPL-SCNC: 4 MMOL/L (ref 3.4–5.3)
SODIUM SERPL-SCNC: 140 MMOL/L (ref 135–145)
TRIGL SERPL-MCNC: 199 MG/DL

## 2023-11-13 NOTE — RESULT ENCOUNTER NOTE
Otis Delaney,  Your fasting blood sugar is just borderline elevated.  The rest of your basic metabolic panel is normal.    Your lipid panel (cholesterol) results are still elevated - about the same as last year.      We can talk more about this at your upcoming appointment.    Bessy Reddy MD

## 2023-11-20 ASSESSMENT — ENCOUNTER SYMPTOMS
CONSTIPATION: 0
DIZZINESS: 0
FREQUENCY: 0
SHORTNESS OF BREATH: 0
COUGH: 0
DYSURIA: 0
HEMATOCHEZIA: 0
BREAST MASS: 0
MYALGIAS: 1
DIARRHEA: 0
HEADACHES: 0
JOINT SWELLING: 0
WEAKNESS: 0
ARTHRALGIAS: 1
NAUSEA: 0
ABDOMINAL PAIN: 0
EYE PAIN: 0
SORE THROAT: 0
HEMATURIA: 0
PARESTHESIAS: 0
HEARTBURN: 0
CHILLS: 0
NERVOUS/ANXIOUS: 0
FEVER: 0
PALPITATIONS: 0

## 2023-11-27 ENCOUNTER — OFFICE VISIT (OUTPATIENT)
Dept: FAMILY MEDICINE | Facility: CLINIC | Age: 58
End: 2023-11-27
Payer: COMMERCIAL

## 2023-11-27 VITALS
SYSTOLIC BLOOD PRESSURE: 130 MMHG | RESPIRATION RATE: 15 BRPM | HEIGHT: 66 IN | TEMPERATURE: 96.9 F | BODY MASS INDEX: 30.53 KG/M2 | HEART RATE: 83 BPM | WEIGHT: 190 LBS | OXYGEN SATURATION: 97 % | DIASTOLIC BLOOD PRESSURE: 87 MMHG

## 2023-11-27 DIAGNOSIS — Z00.00 ROUTINE GENERAL MEDICAL EXAMINATION AT A HEALTH CARE FACILITY: Primary | ICD-10-CM

## 2023-11-27 DIAGNOSIS — F33.42 RECURRENT MAJOR DEPRESSION IN FULL REMISSION (H): ICD-10-CM

## 2023-11-27 DIAGNOSIS — F50.20 BULIMIA NERVOSA: ICD-10-CM

## 2023-11-27 DIAGNOSIS — F41.1 GAD (GENERALIZED ANXIETY DISORDER): ICD-10-CM

## 2023-11-27 PROBLEM — R73.01 ELEVATED FASTING GLUCOSE: Status: ACTIVE | Noted: 2023-11-27

## 2023-11-27 PROCEDURE — 99396 PREV VISIT EST AGE 40-64: CPT | Performed by: FAMILY MEDICINE

## 2023-11-27 PROCEDURE — 99213 OFFICE O/P EST LOW 20 MIN: CPT | Mod: 25 | Performed by: FAMILY MEDICINE

## 2023-11-27 RX ORDER — ESCITALOPRAM OXALATE 10 MG/1
15 TABLET ORAL DAILY
Qty: 135 TABLET | Refills: 0 | Status: SHIPPED | OUTPATIENT
Start: 2023-11-27 | End: 2024-02-26

## 2023-11-27 RX ORDER — OMEPRAZOLE 20 MG/1
20 TABLET, DELAYED RELEASE ORAL DAILY
COMMUNITY
Start: 2023-11-27 | End: 2024-05-30

## 2023-11-27 ASSESSMENT — PAIN SCALES - GENERAL: PAINLEVEL: NO PAIN (0)

## 2023-11-27 ASSESSMENT — ENCOUNTER SYMPTOMS
HEARTBURN: 0
BREAST MASS: 0
ABDOMINAL PAIN: 0
FEVER: 0
SHORTNESS OF BREATH: 0
EYE PAIN: 0
FREQUENCY: 0
HEMATURIA: 0
DIARRHEA: 0
PALPITATIONS: 0
JOINT SWELLING: 0
ARTHRALGIAS: 1
HEADACHES: 0
SORE THROAT: 0
DYSURIA: 0
COUGH: 0
MYALGIAS: 1
CHILLS: 0
HEMATOCHEZIA: 0
CONSTIPATION: 0
NERVOUS/ANXIOUS: 0
DIZZINESS: 0
NAUSEA: 0
WEAKNESS: 0
PARESTHESIAS: 0

## 2023-11-27 ASSESSMENT — PATIENT HEALTH QUESTIONNAIRE - PHQ9
10. IF YOU CHECKED OFF ANY PROBLEMS, HOW DIFFICULT HAVE THESE PROBLEMS MADE IT FOR YOU TO DO YOUR WORK, TAKE CARE OF THINGS AT HOME, OR GET ALONG WITH OTHER PEOPLE: NOT DIFFICULT AT ALL
SUM OF ALL RESPONSES TO PHQ QUESTIONS 1-9: 4
SUM OF ALL RESPONSES TO PHQ QUESTIONS 1-9: 4

## 2023-11-27 NOTE — PATIENT INSTRUCTIONS
Decrease the Lexapro dose to 15 mg daily for 2 months.  If all is well then decrease the dose to 10 mg daily.  If that works well, then after 2 months decrease to 5 mg daily for 2 months then try stopping it.    Let me know if, at any point, you feel the depression is well controlled but you are having more anxiety or insomnia.  Then we would consider stopping the Wellbutrin.      If you have MyChart:  1) I kindly request that you check your MyChart prior to all appointments with me and complete any assigned questionnaires ahead of time.    2) You may receive auto-released results from our system before I have the opportunity to review and comment.  Be assured I will review and comment on all of your results as soon as I can.      FYI:  My schedule has been booking out very far in advance (2 months).  I apologize for the lack of timely access.  If you need to be seen for a chronic condition or preventive (wellness) visit, please be sure to schedule that appointment 2-3 months in advance.  If you have a concern that you feel cannot wait until my next available appointment (such as a hospital follow-up, pre-op, or new symptom of concern) please call clinic at [phone] and press #2.  Then ask to speak to one of the [facility] nurses who can often offer you an appointment with me within a week or so.    I do ask that all patients who are taking chronic medications for conditions that I am managing schedule an in-person visit with me at least once a year.         Preventive Health Recommendations  Female Ages 50 - 64    Yearly exam: See your health care provider every year in order to  Review health changes.   Discuss preventive care.    Review your medicines if your doctor has prescribed any.    Get a Pap test every three years (unless you have an abnormal result and your provider advises testing more often).  If you get Pap tests with HPV test, you only need to test every 5 years, unless you have an abnormal  result.   You do not need a Pap test if your uterus was removed (hysterectomy) and you have not had cancer.  You should be tested each year for STDs (sexually transmitted diseases) if you're at risk.   Have a mammogram every 1 to 2 years.  Have a colonoscopy at age 45, or have a yearly FIT test (stool test). These exams screen for colon cancer.    Have a cholesterol test every 5 years, or more often if advised.  Have a diabetes test (fasting glucose) every three years. If you are at risk for diabetes, you should have this test more often.   If you are at risk for osteoporosis (brittle bone disease), think about having a bone density scan (DEXA).    Shots: Get a flu shot each year. Get a tetanus shot every 10 years.    Nutrition:   Eat at least 5 servings of fruits and vegetables each day.  Eat whole-grain bread, whole-wheat pasta and brown rice instead of white grains and rice.  Get adequate Calcium and Vitamin D.     Lifestyle  Exercise at least 150 minutes a week (30 minutes a day, 5 days a week). This will help you control your weight and prevent disease.  Limit alcohol to one drink per day.  No smoking.   Wear sunscreen to prevent skin cancer.   See your dentist every six months for an exam and cleaning.  See your eye doctor every 1 to 2 years.

## 2023-11-27 NOTE — PROGRESS NOTES
SUBJECTIVE:   Elaina is a 58 year old, presenting for the following:  Physical (Physical )        2023     7:14 AM   Additional Questions   Roomed by Earline Osorio         2023     7:14 AM   Patient Reported Additional Medications   Patient reports taking the following new medications omeprazole 20 MG tablet       Healthy Habits:     Getting at least 3 servings of Calcium per day:  Yes    Bi-annual eye exam:  Yes    Dental care twice a year:  Yes    Sleep apnea or symptoms of sleep apnea:  None    Diet:  Regular (no restrictions)    Frequency of exercise:  None    Medication side effects:  None    Additional concerns today:  Yes      Today's PHQ-9 Score:       2023     7:08 AM   PHQ-9 SCORE   PHQ-9 Total Score MyChart 4 (Minimal depression)   PHQ-9 Total Score 4         Social History     Tobacco Use    Smoking status: Former     Packs/day: 1.00     Years: 25.00     Additional pack years: 0.00     Total pack years: 25.00     Types: Cigarettes     Start date: 1978     Quit date: 2003     Years since quittin.9    Smokeless tobacco: Never   Substance Use Topics    Alcohol use: Yes     Alcohol/week: 5.0 standard drinks of alcohol     Types: 5 Glasses of wine per week     Comment: social/occasional           2023    10:43 AM   Alcohol Use   Prescreen: >3 drinks/day or >7 drinks/week? No         10/9/2022     9:38 PM   AUDIT - Alcohol Use Disorders Identification Test - Reproduced from the World Health Organization Audit 2001 (Second Edition)   1.  How often do you have a drink containing alcohol? 2 to 3 times a week   2.  How many drinks containing alcohol do you have on a typical day when you are drinking? 3 or 4   3.  How often do you have five or more drinks on one occasion? Less than monthly   4.  How often during the last year have you found that you were not able to stop drinking once you had started? Never   5.  How often during the last year have you failed to do what was  normally expected of you because of drinking? Never   6.  How often during the last year have you needed a first drink in the morning to get yourself going after a heavy drinking session? Never   7.  How often during the last year have you had a feeling of guilt or remorse after drinking? Never   8.  How often during the last year have you been unable to remember what happened the night before because of your drinking? Never   9.  Have you or someone else been injured because of your drinking? No   10. Has a relative, friend, doctor or other health care worker been concerned about your drinking or suggested you cut down? No   TOTAL SCORE 5     Reviewed orders with patient.  Reviewed health maintenance and updated orders accordingly - Yes  BP Readings from Last 3 Encounters:   11/27/23 130/87   10/11/22 104/70   07/27/21 112/74    Wt Readings from Last 3 Encounters:   11/27/23 86.2 kg (190 lb)   10/11/22 82.6 kg (182 lb)   07/27/21 71.7 kg (158 lb)              Breast Cancer Screening:  FHS-7:       7/24/2021     8:45 AM 11/3/2021    12:08 PM 11/3/2021     1:18 PM 10/9/2022     9:39 PM 10/10/2022     4:44 PM 10/4/2023    11:45 AM 11/20/2023    10:45 AM   Breast CA Risk Assessment (FHS-7)   Did any of your first-degree relatives have breast or ovarian cancer? No No No Yes No Yes Yes   Did any of your relatives have bilateral breast cancer? No No No No No No Unknown   Did any man in your family have breast cancer? No No No No No No No   Did any woman in your family have breast and ovarian cancer? No No No No No No Yes   Did any woman in your family have breast cancer before age 50 y? No No No No No No No   Do you have 2 or more relatives with breast and/or ovarian cancer? No No No No No No No   Do you have 2 or more relatives with breast and/or bowel cancer? No No No No Yes Yes No     Mammogram Screening: Recommended mammography every 1-2 years with patient discussion and risk factor consideration  Pertinent mammograms  are reviewed under the imaging tab.    History of abnormal Pap smear: YES - PAP with 3 year interval with co-testing recommended      Latest Ref Rng & Units 10/11/2022     8:17 AM 10/21/2019    10:16 AM 10/21/2019     9:24 AM   PAP / HPV   PAP  Negative for Intraepithelial Lesion or Malignancy (NILM)      PAP (Historical)    NIL    HPV 16 DNA Negative Negative  Negative     HPV 18 DNA Negative Negative  Negative     Other HR HPV Negative Negative  Negative       Reviewed and updated as needed this visit by clinical staff   Tobacco  Allergies  Meds  Problems             Reviewed and updated as needed this visit by Provider     Meds  Problems            Past Medical History:   Diagnosis Date    Anxiety disorder     Basal cell carcinoma (BCC) of left forearm 2020    followed by [facility]    BCC (basal cell carcinoma), face 2020    Left ala, s/p MOHS    Bulimia nervosa (H28)     Cervical high risk HPV (human papillomavirus) test positive 16, 17, 18    See problem list    Depressive disorder     Irritable bowel syndrome     Major depression     Menopause 2011    Other specified genital prolapse(618.89)       Past Surgical History:   Procedure Laterality Date    BIOPSY      Cervical, Skin    COLONOSCOPY  10/09/2013    rpt 10 yrs    COSMETIC SURGERY  21    Moh's surgery repair (nose)    LASIK      bilateral    ZZC INCISION EXTEN FOOT/TOE TENDON  2003    toe shortened     OB History    Para Term  AB Living   2 2 2 0 0 2   SAB IAB Ectopic Multiple Live Births   0 0 0 0 2      # Outcome Date GA Lbr Jonathan/2nd Weight Sex Delivery Anes PTL Lv   2 Term 95 40w0d       MOMO      Name: Glory   1 Term 93 40w0d       MOMO      Name: Coni      Obstetric Comments    x 2       Review of Systems   Constitutional:  Negative for chills and fever.   HENT:  Negative for congestion, ear pain, hearing loss and sore throat.    Eyes:  Negative for pain  "and visual disturbance.   Respiratory:  Negative for cough and shortness of breath.    Cardiovascular:  Negative for chest pain, palpitations and peripheral edema.   Gastrointestinal:  Negative for abdominal pain, constipation, diarrhea, heartburn, hematochezia and nausea.   Breasts:  Negative for tenderness, breast mass and discharge.   Genitourinary:  Negative for dysuria, frequency, genital sores, hematuria, pelvic pain, urgency, vaginal bleeding and vaginal discharge.   Musculoskeletal:  Positive for arthralgias and myalgias. Negative for joint swelling.   Skin:  Negative for rash.   Neurological:  Negative for dizziness, weakness, headaches and paresthesias.   Psychiatric/Behavioral:  Negative for mood changes. The patient is not nervous/anxious.         OBJECTIVE:   /87 (BP Location: Right arm, Patient Position: Sitting, Cuff Size: Adult Regular)   Pulse 83   Temp 96.9  F (36.1  C) (Temporal)   Resp 15   Ht 1.676 m (5' 6\")   Wt 86.2 kg (190 lb)   LMP 05/26/2011   SpO2 97%   BMI 30.67 kg/m    Physical Exam  GENERAL: healthy, alert and no distress  EYES: Eyes grossly normal to inspection, conjunctivae and sclerae normal  HENT: ear canals and TM's normal  NECK: no adenopathy, no asymmetry, masses, or scars and thyroid normal to palpation  RESP: lungs clear to auscultation - no rales, rhonchi or wheezes  CV: regular rate and rhythm, normal S1 S2, no S3 or S4, no murmur, click or rub, no peripheral edema  ABDOMEN: soft, nontender, no hepatosplenomegaly, no masses  MS: no gross musculoskeletal defects noted, no edema  SKIN: no suspicious lesions or rashes  NEURO: Grossly normal strength and tone, mentation intact and speech normal  PSYCH: mentation appears normal, affect normal/bright    Lab on 11/10/2023   Component Date Value Ref Range Status    Cholesterol 11/10/2023 255 (H)  <200 mg/dL Final    Triglycerides 11/10/2023 199 (H)  <150 mg/dL Final    Direct Measure HDL 11/10/2023 51  >=50 mg/dL Final "    LDL Cholesterol Calculated 11/10/2023 164 (H)  <=100 mg/dL Final    Non HDL Cholesterol 11/10/2023 204 (H)  <130 mg/dL Final    Sodium 11/10/2023 140  135 - 145 mmol/L Final    Reference intervals for this test were updated on 09/26/2023 to more accurately reflect our healthy population. There may be differences in the flagging of prior results with similar values performed with this method. Interpretation of those prior results can be made in the context of the updated reference intervals.     Potassium 11/10/2023 4.0  3.4 - 5.3 mmol/L Final    Chloride 11/10/2023 103  98 - 107 mmol/L Final    Carbon Dioxide (CO2) 11/10/2023 24  22 - 29 mmol/L Final    Anion Gap 11/10/2023 13  7 - 15 mmol/L Final    Urea Nitrogen 11/10/2023 13.8  8.0 - 23.0 mg/dL Final    Creatinine 11/10/2023 0.88  0.51 - 0.95 mg/dL Final    GFR Estimate 11/10/2023 75  >60 mL/min/1.73m2 Final    Calcium 11/10/2023 10.0  8.8 - 10.2 mg/dL Final    Glucose 11/10/2023 104 (H)  70 - 99 mg/dL Final      The 10-year ASCVD risk score (Angelique DIAZ, et al., 2019) is: 4.3%    Values used to calculate the score:      Age: 58 years      Sex: Female      Is Non- : No      Diabetic: No      Tobacco smoker: No      Systolic Blood Pressure: 130 mmHg      Is BP treated: No      HDL Cholesterol: 51 mg/dL      Total Cholesterol: 255 mg/dL     ASSESSMENT/PLAN:     Routine general medical examination at a health care facility  Reviewed/updated Health Maintenance     Recurrent major depression in full remission (H24)  EDITH (generalized anxiety disorder)  She's doing well now that she's past her divorce and would like to try a dose wean. I suggested we start weaning escitalopram while holding Wellbutrin dose steady.  She'll decrease the escitalopram dose slowly in 5 mg increments.    - escitalopram (LEXAPRO) 10 MG tablet  Dispense: 135 tablet; Refill: 0    Bulimia nervosa (H28)  She's been working closely with a therapist and doing well.  She's  been introducing more foods into her diet with goal of moderation.  She's been limited in her exercise ability due to plantar fasciitis issues.       COUNSELING:  Reviewed preventive health counseling, as reflected in patient instructions      She reports that she quit smoking about 20 years ago. Her smoking use included cigarettes. She started smoking about 45 years ago. She has a 25.00 pack-year smoking history. She has never used smokeless tobacco.        Bessy Reddy MD  [facility]

## 2023-12-29 ENCOUNTER — TELEPHONE (OUTPATIENT)
Dept: FAMILY MEDICINE | Facility: CLINIC | Age: 58
End: 2023-12-29
Payer: COMMERCIAL

## 2023-12-29 ENCOUNTER — E-VISIT (OUTPATIENT)
Dept: URGENT CARE | Facility: CLINIC | Age: 58
End: 2023-12-29
Payer: COMMERCIAL

## 2023-12-29 DIAGNOSIS — R06.2 WHEEZING: ICD-10-CM

## 2023-12-29 DIAGNOSIS — R05.1 ACUTE COUGH: Primary | ICD-10-CM

## 2023-12-29 PROCEDURE — 99421 OL DIG E/M SVC 5-10 MIN: CPT | Performed by: FAMILY MEDICINE

## 2023-12-29 RX ORDER — GUAIFENESIN 600 MG/1
1200 TABLET, EXTENDED RELEASE ORAL 2 TIMES DAILY
Qty: 28 TABLET | Refills: 0 | Status: SHIPPED | OUTPATIENT
Start: 2023-12-29 | End: 2024-01-05

## 2023-12-29 RX ORDER — BENZONATATE 200 MG/1
200 CAPSULE ORAL 3 TIMES DAILY PRN
Qty: 21 CAPSULE | Refills: 0 | Status: SHIPPED | OUTPATIENT
Start: 2023-12-29

## 2023-12-29 RX ORDER — ALBUTEROL SULFATE 90 UG/1
2 AEROSOL, METERED RESPIRATORY (INHALATION) EVERY 6 HOURS PRN
Qty: 18 G | Refills: 0 | Status: SHIPPED | OUTPATIENT
Start: 2023-12-29

## 2023-12-29 NOTE — PATIENT INSTRUCTIONS
Thank you for choosing us for your care. I have placed an order for a prescription so that you can start treatment. View your full visit summary for details by clicking on the link below. Your pharmacist will able to address any questions you may have about the medication.     If you're not feeling better within 5-7 days, please schedule an appointment.  You can schedule an appointment right here in [facility], or call [phone]  If the visit is for the same symptoms as your eVisit, we'll refund the cost of your eVisit if seen within seven days.

## 2024-02-24 DIAGNOSIS — F41.1 GAD (GENERALIZED ANXIETY DISORDER): ICD-10-CM

## 2024-02-24 DIAGNOSIS — F33.42 RECURRENT MAJOR DEPRESSION IN FULL REMISSION (H): ICD-10-CM

## 2024-02-26 RX ORDER — ESCITALOPRAM OXALATE 10 MG/1
15 TABLET ORAL DAILY
Qty: 135 TABLET | Refills: 0 | Status: SHIPPED | OUTPATIENT
Start: 2024-02-26 | End: 2024-05-09

## 2024-05-08 ENCOUNTER — MYC MEDICAL ADVICE (OUTPATIENT)
Dept: FAMILY MEDICINE | Facility: CLINIC | Age: 59
End: 2024-05-08
Payer: COMMERCIAL

## 2024-05-08 DIAGNOSIS — F41.1 GAD (GENERALIZED ANXIETY DISORDER): ICD-10-CM

## 2024-05-08 DIAGNOSIS — F33.42 RECURRENT MAJOR DEPRESSION IN FULL REMISSION (H): Primary | ICD-10-CM

## 2024-05-08 NOTE — TELEPHONE ENCOUNTER
"Pended medication    \"Hi Dr. Reddy.  If you recall from my last physical, I was going to try and slowly reduce and eventually eliminate my depression/anxiety meds.  As such, you changed my escitalopram 20 MG tablet prescription to 15 MG tablets.  Since [facility] said they didn't make 15 MG tablets they filled with 10 MG tablets, which, required me to cut one in half.  Anyways, I went from 20 MG to 15 MG  but started experiencing the same anxiety and depression symptoms that caused me to be prescribed the medication.  Anyways, I'm nearly out of the 10 MG tablets (taking 2 at this time) and am wondering if you would please call in a new prescription for 20 MG escitalopram so I only h ave to take 1 tablet each day.     Thanks,  Elaina\"  "

## 2024-05-09 RX ORDER — ESCITALOPRAM OXALATE 20 MG/1
20 TABLET ORAL DAILY
Qty: 90 TABLET | Refills: 1 | Status: SHIPPED | OUTPATIENT
Start: 2024-05-09

## 2024-05-13 DIAGNOSIS — F33.42 RECURRENT MAJOR DEPRESSION IN FULL REMISSION (H): ICD-10-CM

## 2024-05-14 RX ORDER — BUPROPION HYDROCHLORIDE 150 MG/1
150 TABLET ORAL EVERY MORNING
Qty: 90 TABLET | Refills: 0 | Status: SHIPPED | OUTPATIENT
Start: 2024-05-14 | End: 2024-09-23

## 2024-05-30 ENCOUNTER — MYC REFILL (OUTPATIENT)
Dept: FAMILY MEDICINE | Facility: CLINIC | Age: 59
End: 2024-05-30
Payer: COMMERCIAL

## 2024-05-30 DIAGNOSIS — K21.9 GASTROESOPHAGEAL REFLUX DISEASE, UNSPECIFIED WHETHER ESOPHAGITIS PRESENT: Primary | ICD-10-CM

## 2024-05-30 RX ORDER — OMEPRAZOLE 20 MG/1
20 TABLET, DELAYED RELEASE ORAL DAILY
Qty: 90 TABLET | Refills: 1 | Status: SHIPPED | OUTPATIENT
Start: 2024-05-30

## 2024-09-18 ENCOUNTER — MYC MEDICAL ADVICE (OUTPATIENT)
Dept: FAMILY MEDICINE | Facility: CLINIC | Age: 59
End: 2024-09-18
Payer: COMMERCIAL

## 2024-09-18 DIAGNOSIS — Z13.1 SCREENING FOR DIABETES MELLITUS: ICD-10-CM

## 2024-09-18 DIAGNOSIS — R73.01 ELEVATED FASTING GLUCOSE: ICD-10-CM

## 2024-09-18 DIAGNOSIS — Z13.220 LIPID SCREENING: Primary | ICD-10-CM

## 2024-09-19 NOTE — TELEPHONE ENCOUNTER
I placed lab orders.  I recommend she schedule a fasting lab-only appointment.  Fast for 10 hours prior to labs: nothing to eat or drink except plain water and your pills for ten hours prior to appointment.  In addition, avoid fatty foods and alcohol for 24 hours prior to appointment.

## 2024-09-19 NOTE — TELEPHONE ENCOUNTER
Please see PacerPro message and advise.  Pt requesting labs orders placed before appt to discuss results at appt.    TALYA StocktonN, RN   [facility]

## 2024-09-22 DIAGNOSIS — F33.42 RECURRENT MAJOR DEPRESSION IN FULL REMISSION (H): ICD-10-CM

## 2024-09-23 RX ORDER — BUPROPION HYDROCHLORIDE 150 MG/1
150 TABLET ORAL EVERY MORNING
Qty: 90 TABLET | Refills: 0 | Status: SHIPPED | OUTPATIENT
Start: 2024-09-23

## 2024-09-26 ENCOUNTER — LAB (OUTPATIENT)
Dept: LAB | Facility: CLINIC | Age: 59
End: 2024-09-26
Payer: COMMERCIAL

## 2024-09-26 DIAGNOSIS — Z13.1 SCREENING FOR DIABETES MELLITUS: ICD-10-CM

## 2024-09-26 DIAGNOSIS — R73.01 ELEVATED FASTING GLUCOSE: ICD-10-CM

## 2024-09-26 DIAGNOSIS — Z13.220 LIPID SCREENING: ICD-10-CM

## 2024-09-26 LAB
EST. AVERAGE GLUCOSE BLD GHB EST-MCNC: 140 MG/DL
HBA1C MFR BLD: 6.5 % (ref 0–5.6)

## 2024-09-26 PROCEDURE — 80061 LIPID PANEL: CPT

## 2024-09-26 PROCEDURE — 36415 COLL VENOUS BLD VENIPUNCTURE: CPT

## 2024-09-26 PROCEDURE — 83036 HEMOGLOBIN GLYCOSYLATED A1C: CPT

## 2024-09-26 PROCEDURE — 80048 BASIC METABOLIC PNL TOTAL CA: CPT

## 2024-09-27 LAB
ANION GAP SERPL CALCULATED.3IONS-SCNC: 16 MMOL/L (ref 7–15)
BUN SERPL-MCNC: 17 MG/DL (ref 8–23)
CALCIUM SERPL-MCNC: 9.4 MG/DL (ref 8.8–10.4)
CHLORIDE SERPL-SCNC: 99 MMOL/L (ref 98–107)
CHOLEST SERPL-MCNC: 233 MG/DL
CREAT SERPL-MCNC: 0.88 MG/DL (ref 0.51–0.95)
EGFRCR SERPLBLD CKD-EPI 2021: 74 ML/MIN/1.73M2
FASTING STATUS PATIENT QL REPORTED: NO
FASTING STATUS PATIENT QL REPORTED: NO
GLUCOSE SERPL-MCNC: 115 MG/DL (ref 70–99)
HCO3 SERPL-SCNC: 22 MMOL/L (ref 22–29)
HDLC SERPL-MCNC: 50 MG/DL
LDLC SERPL CALC-MCNC: 144 MG/DL
NONHDLC SERPL-MCNC: 183 MG/DL
POTASSIUM SERPL-SCNC: 3.8 MMOL/L (ref 3.4–5.3)
SODIUM SERPL-SCNC: 137 MMOL/L (ref 135–145)
TRIGL SERPL-MCNC: 194 MG/DL

## 2024-09-30 NOTE — RESULT ENCOUNTER NOTE
Otis Delaney,  Your fasting blood sugar and Hemoglobin A1C (a test assessing overall blood sugar control for the last 3 months) are elevated - to the threshold of diabetes.  For blood sugar control try cutting back on your carbohydrate intake:  Eat less sweets, bread, pasta, rice, and potatoes and more fruit, vegetables, and lean protein.  Also try taking a brief walk after meals.  This can also help control blood sugar and decrease the risk of developing diabetes.     Your lipid panel (cholesterol) results are improved compared to last year.    Let's talk about all of this when I see you in a couple weeks.    Bessy Reddy MD

## 2024-10-10 ENCOUNTER — ANCILLARY PROCEDURE (OUTPATIENT)
Dept: MAMMOGRAPHY | Facility: CLINIC | Age: 59
End: 2024-10-10
Attending: FAMILY MEDICINE
Payer: COMMERCIAL

## 2024-10-10 DIAGNOSIS — Z12.31 VISIT FOR SCREENING MAMMOGRAM: ICD-10-CM

## 2024-10-10 PROCEDURE — 77067 SCR MAMMO BI INCL CAD: CPT | Mod: TC | Performed by: RADIOLOGY

## 2024-10-10 PROCEDURE — 77063 BREAST TOMOSYNTHESIS BI: CPT | Mod: TC | Performed by: RADIOLOGY

## 2024-10-10 SDOH — HEALTH STABILITY: PHYSICAL HEALTH: ON AVERAGE, HOW MANY DAYS PER WEEK DO YOU ENGAGE IN MODERATE TO STRENUOUS EXERCISE (LIKE A BRISK WALK)?: 0 DAYS

## 2024-10-10 SDOH — HEALTH STABILITY: PHYSICAL HEALTH: ON AVERAGE, HOW MANY MINUTES DO YOU ENGAGE IN EXERCISE AT THIS LEVEL?: 0 MIN

## 2024-10-10 ASSESSMENT — SOCIAL DETERMINANTS OF HEALTH (SDOH): HOW OFTEN DO YOU GET TOGETHER WITH FRIENDS OR RELATIVES?: THREE TIMES A WEEK

## 2024-10-14 ASSESSMENT — PATIENT HEALTH QUESTIONNAIRE - PHQ9
SUM OF ALL RESPONSES TO PHQ QUESTIONS 1-9: 5
SUM OF ALL RESPONSES TO PHQ QUESTIONS 1-9: 5
10. IF YOU CHECKED OFF ANY PROBLEMS, HOW DIFFICULT HAVE THESE PROBLEMS MADE IT FOR YOU TO DO YOUR WORK, TAKE CARE OF THINGS AT HOME, OR GET ALONG WITH OTHER PEOPLE: NOT DIFFICULT AT ALL

## 2024-10-15 ENCOUNTER — OFFICE VISIT (OUTPATIENT)
Dept: FAMILY MEDICINE | Facility: CLINIC | Age: 59
End: 2024-10-15
Payer: COMMERCIAL

## 2024-10-15 VITALS
DIASTOLIC BLOOD PRESSURE: 87 MMHG | HEIGHT: 66 IN | TEMPERATURE: 97 F | OXYGEN SATURATION: 97 % | BODY MASS INDEX: 30.37 KG/M2 | RESPIRATION RATE: 16 BRPM | HEART RATE: 82 BPM | SYSTOLIC BLOOD PRESSURE: 136 MMHG | WEIGHT: 189 LBS

## 2024-10-15 DIAGNOSIS — F33.42 RECURRENT MAJOR DEPRESSION IN FULL REMISSION (H): ICD-10-CM

## 2024-10-15 DIAGNOSIS — Z00.00 ROUTINE GENERAL MEDICAL EXAMINATION AT A HEALTH CARE FACILITY: Primary | ICD-10-CM

## 2024-10-15 DIAGNOSIS — G47.00 INSOMNIA, UNSPECIFIED TYPE: ICD-10-CM

## 2024-10-15 DIAGNOSIS — M72.2 PLANTAR FASCIITIS: ICD-10-CM

## 2024-10-15 DIAGNOSIS — R73.01 ELEVATED FASTING GLUCOSE: ICD-10-CM

## 2024-10-15 DIAGNOSIS — F50.20 BULIMIA NERVOSA, UNSPECIFIED SEVERITY: ICD-10-CM

## 2024-10-15 DIAGNOSIS — F41.1 GAD (GENERALIZED ANXIETY DISORDER): ICD-10-CM

## 2024-10-15 DIAGNOSIS — K21.9 GASTROESOPHAGEAL REFLUX DISEASE, UNSPECIFIED WHETHER ESOPHAGITIS PRESENT: ICD-10-CM

## 2024-10-15 DIAGNOSIS — Z23 NEED FOR VACCINATION: ICD-10-CM

## 2024-10-15 PROCEDURE — 90471 IMMUNIZATION ADMIN: CPT | Performed by: FAMILY MEDICINE

## 2024-10-15 PROCEDURE — 91320 SARSCV2 VAC 30MCG TRS-SUC IM: CPT | Performed by: FAMILY MEDICINE

## 2024-10-15 PROCEDURE — 90480 ADMN SARSCOV2 VAC 1/ONLY CMP: CPT | Performed by: FAMILY MEDICINE

## 2024-10-15 PROCEDURE — 99396 PREV VISIT EST AGE 40-64: CPT | Mod: 57 | Performed by: FAMILY MEDICINE

## 2024-10-15 PROCEDURE — 90673 RIV3 VACCINE NO PRESERV IM: CPT | Performed by: FAMILY MEDICINE

## 2024-10-15 PROCEDURE — 99214 OFFICE O/P EST MOD 30 MIN: CPT | Mod: 25 | Performed by: FAMILY MEDICINE

## 2024-10-15 RX ORDER — ESCITALOPRAM OXALATE 20 MG/1
20 TABLET ORAL DAILY
Qty: 90 TABLET | Refills: 4 | Status: SHIPPED | OUTPATIENT
Start: 2024-10-15

## 2024-10-15 RX ORDER — TRAZODONE HYDROCHLORIDE 50 MG/1
50 TABLET, FILM COATED ORAL
Qty: 90 TABLET | Refills: 3 | Status: SHIPPED | OUTPATIENT
Start: 2024-10-15

## 2024-10-15 RX ORDER — BUPROPION HYDROCHLORIDE 150 MG/1
150 TABLET ORAL EVERY MORNING
Qty: 90 TABLET | Refills: 4 | Status: SHIPPED | OUTPATIENT
Start: 2024-10-15

## 2024-10-15 RX ORDER — OMEPRAZOLE 20 MG/1
20 TABLET, DELAYED RELEASE ORAL DAILY
Qty: 90 TABLET | Refills: 4 | Status: SHIPPED | OUTPATIENT
Start: 2024-10-15

## 2024-10-15 ASSESSMENT — PAIN SCALES - GENERAL: PAINLEVEL: NO PAIN (0)

## 2024-10-15 NOTE — PATIENT INSTRUCTIONS
Patient Education   Preventive Care Advice   This is general advice given by our system to help you stay healthy. However, your care team may have specific advice just for you. Please talk to your care team about your preventive care needs.  Nutrition  Eat 5 or more servings of fruits and vegetables each day.  Try wheat bread, brown rice and whole grain pasta (instead of white bread, rice, and pasta).  Get enough calcium and vitamin D. Check the label on foods and aim for 100% of the RDA (recommended daily allowance).  Lifestyle  Exercise at least 150 minutes each week  (30 minutes a day, 5 days a week).  Do muscle strengthening activities 2 days a week. These help control your weight and prevent disease.  No smoking.  Wear sunscreen to prevent skin cancer.  Have a dental exam and cleaning every 6 months.  Yearly exams  See your health care team every year to talk about:  Any changes in your health.  Any medicines your care team has prescribed.  Preventive care, family planning, and ways to prevent chronic diseases.  Shots (vaccines)   HPV shots (up to age 26), if you've never had them before.  Hepatitis B shots (up to age 59), if you've never had them before.  COVID-19 shot: Get this shot when it's due.  Flu shot: Get a flu shot every year.  Tetanus shot: Get a tetanus shot every 10 years.  Pneumococcal, hepatitis A, and RSV shots: Ask your care team if you need these based on your risk.  Shingles shot (for age 50 and up)  General health tests  Diabetes screening:  Starting at age 35, Get screened for diabetes at least every 3 years.  If you are younger than age 35, ask your care team if you should be screened for diabetes.  Cholesterol test: At age 39, start having a cholesterol test every 5 years, or more often if advised.  Bone density scan (DEXA): At age 50, ask your care team if you should have this scan for osteoporosis (brittle bones).  Hepatitis C: Get tested at least once in your life.  STIs (sexually  transmitted infections)  Before age 24: Ask your care team if you should be screened for STIs.  After age 24: Get screened for STIs if you're at risk. You are at risk for STIs (including HIV) if:  You are sexually active with more than one person.  You don't use condoms every time.  You or a partner was diagnosed with a sexually transmitted infection.  If you are at risk for HIV, ask about PrEP medicine to prevent HIV.  Get tested for HIV at least once in your life, whether you are at risk for HIV or not.  Cancer screening tests  Cervical cancer screening: If you have a cervix, begin getting regular cervical cancer screening tests starting at age 21.  Breast cancer scan (mammogram): If you've ever had breasts, begin having regular mammograms starting at age 40. This is a scan to check for breast cancer.  Colon cancer screening: It is important to start screening for colon cancer at age 45.  Have a colonoscopy test every 10 years (or more often if you're at risk) Or, ask your provider about stool tests like a FIT test every year or Cologuard test every 3 years.  To learn more about your testing options, visit:   .  For help making a decision, visit:   https://bit.ly/sz43043.  Prostate cancer screening test: If you have a prostate, ask your care team if a prostate cancer screening test (PSA) at age 55 is right for you.  Lung cancer screening: If you are a current or former smoker ages 50 to 80, ask your care team if ongoing lung cancer screenings are right for you.  For informational purposes only. Not to replace the advice of your health care provider. Copyright   2023 [facility]. All rights reserved. Clinically reviewed by the [facility]. [employer] 835552 - REV 01/24.  Preventing Falls: Care Instructions  Injuries and health problems such as trouble walking or poor eyesight can increase your risk of falling. So can some medicines. But there are things you can do to help  "prevent falls. You can exercise to get stronger. You can also arrange your home to make it safer.    Talk to your doctor about the medicines you take. Ask if any of them increase the risk of falls and whether they can be changed or stopped.   Try to exercise regularly. It can help improve your strength and balance. This can help lower your risk of falling.         Practice fall safety and prevention.   Wear low-heeled shoes that fit well and give your feet good support. Talk to your doctor if you have foot problems that make this hard.  Carry a cellphone or wear a medical alert device that you can use to call for help.  Use stepladders instead of chairs to reach high objects. Don't climb if you're at risk for falls. Ask for help, if needed.  Wear the correct eyeglasses, if you need them.        Make your home safer.   Remove rugs, cords, clutter, and furniture from walkways.  Keep your house well lit. Use night-lights in hallways and bathrooms.  Install and use sturdy handrails on stairways.  Wear nonskid footwear, even inside. Don't walk barefoot or in socks without shoes.        Be safe outside.   Use handrails, curb cuts, and ramps whenever possible.  Keep your hands free by using a shoulder bag or backpack.  Try to walk in well-lit areas. Watch out for uneven ground, changes in pavement, and debris.  Be careful in the winter. Walk on the grass or gravel when sidewalks are slippery. Use de-icer on steps and walkways. Add non-slip devices to shoes.    Put grab bars and nonskid mats in your shower or tub and near the toilet. Try to use a shower chair or bath bench when bathing.   Get into a tub or shower by putting in your weaker leg first. Get out with your strong side first. Have a phone or medical alert device in the bathroom with you.   Where can you learn more?  Go to https://www.[employer]ist[employer].net/patiented  Enter G117 in the search box to learn more about \"Preventing Falls: Care Instructions.\"  Current as of: " July 17, 2023  Content Version: 14.2 2024 [facility] [employer].   Care instructions adapted under license by your healthcare professional. If you have questions about a medical condition or this instruction, always ask your healthcare professional. [employer] disclaims any warranty or liability for your use of this information.

## 2024-10-15 NOTE — PROGRESS NOTES
"Preventive Care Visit  [facility]  Bessy Reddy MD, Family Medicine  Oct 15, 2024      Assessment & Plan     Routine general medical examination at a health care facility  Reviewed/updated Health Maintenance    Gastroesophageal reflux disease, unspecified whether esophagitis present  - omeprazole (PRILOSEC OTC) 20 MG EC tablet  Dispense: 90 tablet; Refill: 4    Recurrent major depression in full remission (H)  stable/adequately controlled - Continue current medication regimen.   - escitalopram (LEXAPRO) 20 MG tablet  Dispense: 90 tablet; Refill: 4  - buPROPion (WELLBUTRIN XL) 150 MG 24 hr tablet  Dispense: 90 tablet; Refill: 4    EDITH (generalized anxiety disorder)  stable/adequately controlled - Continue current medication regimen.   - escitalopram (LEXAPRO) 20 MG tablet  Dispense: 90 tablet; Refill: 4    Insomnia, unspecified type  - traZODone (DESYREL) 50 MG tablet  Dispense: 90 tablet; Refill: 3    Plantar fasciitis  Demonstrated stretches for gastroc/soleus/foot.  I also recommended night splints.      Elevated fasting glucose  Borderline DM.  We'll monitor.  Consider limiting gluten (but not restricting carbs overall) to see if she has less gas/bloating.  Discussed importance of eating carbs WITH protein and fat to decrease blood sugar spikes.      Bulimia nervosa, unspecified severity  Continue to eat all food groups and exercise in moderation.  Consider recumbent bike or rowing machine while plantar fasciitis is a problem.      Need for vaccination  - INFLUENZA VACCINE TRIVALENT(FLUBLOK)  - COVID-19 12+ (PFIZER)        BMI  Estimated body mass index is 30.37 kg/m  as calculated from the following:    Height as of this encounter: 1.68 m (5' 6.14\").    Weight as of this encounter: 85.7 kg (189 lb).   She has gained about 30# in the past 3 years when she was addressing her eating disorder. She'll continue to work on health diet without food group restrictions and moderate " exercise.      Counseling  Appropriate preventive services were addressed with this patient via screening, questionnaire, or discussion as appropriate for fall prevention, nutrition, physical activity, Tobacco-use cessation, social engagement, weight loss and cognition.  Checklist reviewing preventive services available has been given to the patient.  Reviewed patient's diet, addressing concerns and/or questions.   She is at risk for psychosocial distress and has been provided with information to reduce risk.   The patient's PHQ-9 score is consistent with mild depression. She was provided with information regarding depression.     See Patient Instructions        Carlos Delaney is a 59 year old, presenting for the following:  Physical        10/15/2024     8:25 AM   Additional Questions   Roomed by Earline Osorio        Health Care Directive  Patient has a Health Care Directive on file  Advance care planning document is on file and is current.    HPI    Weight gain - She has struggled with Bulimia since adolescence.  Had exacerbation a few years ago.  Was seeing a therapist and has been working on not restricting foods in her diet, not purging, not over-exercising.  She feels better mentally but has gained weight.  She also notes bloating and gas symptoms with adding carbs back into her diet.  A1c has crept up.    Plantar fasciitis - started with one foot then developed it in the other foot.  This has limited her exercising.      Depression/Anxiety - Feels her current regimen (Lexapro 20 mg and Wellbutrin 150 mg daily) is working well.      5/8/2023    11:01 AM 11/27/2023     7:08 AM 10/14/2024     9:59 AM   PHQ   PHQ-9 Total Score 4 4 5   Q9: Thoughts of better off dead/self-harm past 2 weeks Not at all Not at all Not at all           10/9/2022     9:32 PM 11/4/2022    10:44 AM 5/10/2023     8:14 AM   EDITH-7 SCORE   Total Score 3 (minimal anxiety) 2 (minimal anxiety) 3 (minimal anxiety)   Total Score 3 2 3                10/10/2024   General Health   How would you rate your overall physical health? Good   Feel stress (tense, anxious, or unable to sleep) Only a little      (!) STRESS CONCERN      10/10/2024   Nutrition   Three or more servings of calcium each day? Yes   Diet: Regular (no restrictions)   How many servings of fruit and vegetables per day? (!) 2-3   How many sweetened beverages each day? 0-1            10/10/2024   Exercise   Days per week of moderate/strenous exercise 0 days   Average minutes spent exercising at this level 0 min      (!) EXERCISE CONCERN      10/10/2024   Social Factors   Frequency of gathering with friends or relatives Three times a week   Worry food won't last until get money to buy more No   Food not last or not have enough money for food? No   Do you have housing? (Housing is defined as stable permanent housing and does not include staying ouside in a car, in a tent, in an abandoned building, in an overnight shelter, or couch-surfing.) Yes   Are you worried about losing your housing? No   Lack of transportation? No   Unable to get utilities (heat,electricity)? No            10/15/2024   Fall Risk   Gait Speed Test (Document in seconds) 3.25   Gait Speed Test Interpretation Less than or equal to 5.00 seconds - PASS             10/10/2024   Dental   Dentist two times every year? Yes            10/10/2024   TB Screening   Were you born outside of the [place]? No          Today's PHQ-9 Score:       10/14/2024     9:59 AM   PHQ-9 SCORE   PHQ-9 Total Score MyChart 5 (Mild depression)   PHQ-9 Total Score 5         10/10/2024   Substance Use   Alcohol more than 3/day or more than 7/wk No   Do you use any other substances recreationally? No        Social History     Tobacco Use    Smoking status: Former     Current packs/day: 0.00     Average packs/day: 1 pack/day for 25.0 years (25.0 ttl pk-yrs)     Types: Cigarettes     Start date: 1978     Quit date: 2003     Years since quittin.8     Smokeless tobacco: Never   Vaping Use    Vaping status: Never Used   Substance Use Topics    Alcohol use: Yes     Alcohol/week: 5.0 standard drinks of alcohol     Types: 5 Glasses of wine per week     Comment: social/occasional    Drug use: No           10/10/2024   LAST FHS-7 RESULTS   1st degree relative breast or ovarian cancer Yes   Any relative bilateral breast cancer No   Any male have breast cancer No   Any ONE woman have BOTH breast AND ovarian cancer Yes   Any woman with breast cancer before 50yrs No   2 or more relatives with breast AND/OR ovarian cancer No   2 or more relatives with breast AND/OR bowel cancer No      Mammogram Screening - Mammogram every 1-2 years updated in Health Maintenance based on mutual decision making          10/10/2024   STI Screening   New sexual partner(s) since last STI/HIV test? No        History of abnormal Pap smear: No - age 30- 62 PAP with HPV every 5 years recommended        Latest Ref Rng & Units 10/11/2022     8:17 AM 10/21/2019    10:16 AM 10/21/2019     9:24 AM   PAP / HPV   PAP  Negative for Intraepithelial Lesion or Malignancy (NILM)      PAP (Historical)    NIL    HPV 16 DNA Negative Negative  Negative     HPV 18 DNA Negative Negative  Negative     Other HR HPV Negative Negative  Negative       ASCVD Risk   The 10-year ASCVD risk score (Angelique DIAZ, et al., 2019) is: 4.9%    Values used to calculate the score:      Age: 59 years      Sex: Female      Is Non- : No      Diabetic: No      Tobacco smoker: No      Systolic Blood Pressure: 136 mmHg      Is BP treated: No      HDL Cholesterol: 50 mg/dL      Total Cholesterol: 233 mg/dL         Reviewed and updated as needed this visit by Provider   Tobacco  Allergies  Meds  Problems  Med Hx  Surg Hx  Fam Hx            BP Readings from Last 3 Encounters:   10/15/24 136/87   11/27/23 130/87   10/11/22 104/70    Wt Readings from Last 3 Encounters:   10/15/24 85.7 kg (189 lb)    23 86.2 kg (190 lb)   10/11/22 82.6 kg (182 lb)               Patient Active Problem List   Diagnosis    Other specified genital prolapse(618.89)    EDITH (generalized anxiety disorder)    Irritable Bowel Syndrome    Insomnia    Unexplained endometrial cells on cervical Pap smear    Cervical high risk HPV (human papillomavirus) test positive    Mild recurrent major depression (H)    BCC (basal cell carcinoma), face    Basal cell carcinoma (BCC) of left forearm    Bulimia nervosa    Elevated fasting glucose    Plantar fasciitis     Past Surgical History:   Procedure Laterality Date    BIOPSY      Cervical, Skin    COLONOSCOPY  10/09/2013    rpt 10 yrs    COSMETIC SURGERY  19    Moh's surgery repair (nose)    GI SURGERY  10/06/2024    Endoscopy - normal    LASIK      bilateral    ZZC INCISION EXTEN FOOT/TOE TENDON      toe shortened       Social History     Tobacco Use    Smoking status: Former     Current packs/day: 0.00     Average packs/day: 1 pack/day for 25.0 years (25.0 ttl pk-yrs)     Types: Cigarettes     Start date: 1978     Quit date: 2003     Years since quittin.8    Smokeless tobacco: Never   Substance Use Topics    Alcohol use: Yes     Alcohol/week: 5.0 standard drinks of alcohol     Types: 5 Glasses of wine per week     Comment: social/occasional     Family History   Problem Relation Age of Onset    Gastrointestinal Disease Mother         gerd    Hyperlipidemia Mother     Laguna's esophagus Mother     Heart Failure Mother     Hypertension Mother     Hypertension Father     Alcohol/Drug Father         alcohol    Lipids Father     Kidney Cancer Father         Renal Cancer    Substance Abuse Father         Alcoholic    Other Cancer Father         Renal Cancer    Gynecology Sister         polycystic overys    GERD Sister     Asthma Sister     Depression Sister     Hypertension Sister     Hyperlipidemia Sister     Asthma Sister     Diabetes Brother         oral     "Hypertension Brother     Substance Abuse Brother     Hyperlipidemia Brother     Asthma Brother     Alcohol/Drug Brother         alcohol    Hypertension Brother     Hyperlipidemia Brother     Diabetes Brother     Hypertension Brother     Hyperlipidemia Brother     Substance Abuse Brother     Asthma Brother     Substance Abuse Brother         Alcoholic    Diabetes Brother     Hypertension Brother     Hyperlipidemia Brother     Diabetes Maternal Grandmother         diet    Heart Disease Paternal Grandfather     Lipids Paternal Grandfather     Respiratory Daughter         asthma    Respiratory Daughter         asthma    Breast Cancer Maternal Aunt     GERD Maternal Uncle     Laguna's esophagus Maternal Uncle     Diabetes Cousin     Breast Cancer Other         Great Aunt    Breast Cancer Other               Objective    Exam  /87 (BP Location: Right arm, Patient Position: Sitting, Cuff Size: Adult Regular)   Pulse 82   Temp 97  F (36.1  C) (Temporal)   Resp 16   Ht 1.68 m (5' 6.14\")   Wt 85.7 kg (189 lb)   LMP 05/26/2011   SpO2 97%   BMI 30.37 kg/m     Estimated body mass index is 30.37 kg/m  as calculated from the following:    Height as of this encounter: 1.68 m (5' 6.14\").    Weight as of this encounter: 85.7 kg (189 lb).    Physical Exam  GENERAL: healthy, alert and no distress  EYES: Eyes grossly normal to inspection, conjunctivae and sclerae normal  HENT: ear canals and TM's normal  NECK: no adenopathy, no asymmetry, masses, or scars and thyroid normal to palpation  RESP: lungs clear to auscultation - no rales, rhonchi or wheezes  CV: regular rate and rhythm, normal S1 S2, no S3 or S4, no murmur, click or rub  ABDOMEN: soft, nontender, no hepatosplenomegaly, no masses  MS: no gross musculoskeletal defects noted, no edema  SKIN: no suspicious lesions or rashes  NEURO: Grossly normal strength and tone, mentation intact and speech normal  PSYCH: mentation appears normal, affect normal/bright      Signed " Electronically by: Bessy Reddy MD

## 2024-11-15 ENCOUNTER — MYC MEDICAL ADVICE (OUTPATIENT)
Dept: FAMILY MEDICINE | Facility: CLINIC | Age: 59
End: 2024-11-15
Payer: COMMERCIAL

## 2025-01-09 ENCOUNTER — TELEPHONE (OUTPATIENT)
Dept: FAMILY MEDICINE | Facility: CLINIC | Age: 60
End: 2025-01-09
Payer: COMMERCIAL

## 2025-01-09 DIAGNOSIS — H10.30 ACUTE CONJUNCTIVITIS, UNSPECIFIED ACUTE CONJUNCTIVITIS TYPE, UNSPECIFIED LATERALITY: Primary | ICD-10-CM

## 2025-01-09 NOTE — TELEPHONE ENCOUNTER
General Call    Contacts       Contact Date/Time Type Contact Phone/Fax    01/09/2025 09:13 AM CST Phone (Incoming) Kaleigh Elaina LÓPEZ (Self) [phone] (H)          Reason for Call:  Has come down with pink eye     What are your questions or concerns:  Requesting medication. Was advised evaluation is needed for medication, but asked request be sent on. Is currently down in [place] so please see preferred pharmacy below if agreeable-    [facility] [address] [address]      Could we send this information to you in [facility] or would you prefer to receive a phone call?:   Patient would prefer a phone call   Okay to leave a detailed message?: Yes at Home number on file [phone] (home)

## 2025-01-10 RX ORDER — POLYMYXIN B SULFATE AND TRIMETHOPRIM 1; 10000 MG/ML; [USP'U]/ML
1-2 SOLUTION OPHTHALMIC EVERY 4 HOURS
Qty: 10 ML | Refills: 0 | Status: SHIPPED | OUTPATIENT
Start: 2025-01-10 | End: 2025-01-15

## 2025-01-10 NOTE — TELEPHONE ENCOUNTER
[place] she's not eligible for [facility] care.  I sent prescription for polytrim drops.      Please review:  Acute conjunctivitis (pinkeye) is highly contagious.  It is spread through direct contact with the eye secretions so it is important that you try to avoid touching your eyes and wash your hands frequently.      While most cases of pinkeye are viral, we do often treat with topical antibiotic drops as it is difficult to clinically differentiate a bacterial pinkeye from a viral pinkeye.  If viral, you should expect the symptoms to resolve within 7-10 days.      Do not wear contact lenses when you have pink eye.  Once your eye(s) clear up you must use a new pair of contact lenses.    Notify us if your symptoms persist beyond this point or if you develop vision changes or eye pain.

## 2025-01-13 NOTE — TELEPHONE ENCOUNTER
Attempted to reach, unable. Left message  read her mychart and this message relayed to her there. Pt was welcomed to call back to triage if questions.      Britney Mayfield RN